# Patient Record
Sex: FEMALE | Race: BLACK OR AFRICAN AMERICAN | NOT HISPANIC OR LATINO | Employment: FULL TIME
[De-identification: names, ages, dates, MRNs, and addresses within clinical notes are randomized per-mention and may not be internally consistent; named-entity substitution may affect disease eponyms.]

---

## 2017-04-24 ENCOUNTER — RECORDS - HEALTHEAST (OUTPATIENT)
Dept: ADMINISTRATIVE | Facility: OTHER | Age: 22
End: 2017-04-24

## 2017-04-24 ENCOUNTER — TRANSFERRED RECORDS (OUTPATIENT)
Dept: HEALTH INFORMATION MANAGEMENT | Facility: CLINIC | Age: 22
End: 2017-04-24

## 2017-05-12 ENCOUNTER — PRE VISIT (OUTPATIENT)
Dept: NEUROLOGY | Facility: CLINIC | Age: 22
End: 2017-05-12

## 2017-05-12 NOTE — TELEPHONE ENCOUNTER
1.  Date/reason for appt: 5/22/17, Christian Yoder  2.  Referring provider: ANGELLA KONG MD  3.  Call to patient (Yes / No - short description): No, referred  4.  Previous care at / records requested from:   [facility]- faxed cover sheet.

## 2017-05-16 NOTE — TELEPHONE ENCOUNTER
Records received from [facility].   Included  Office notes: 3/3/17, 3/17/17, 3/29/17, 3/31/17, 4/24/17  Other: EMG on 5/17/16, 6/9/16    Missing/needed records: has seen a neurologist

## 2017-05-22 ENCOUNTER — OFFICE VISIT (OUTPATIENT)
Dept: NEUROLOGY | Facility: CLINIC | Age: 22
End: 2017-05-22

## 2017-05-22 VITALS — HEART RATE: 85 BPM | SYSTOLIC BLOOD PRESSURE: 132 MMHG | HEIGHT: 67 IN | DIASTOLIC BLOOD PRESSURE: 76 MMHG

## 2017-05-22 DIAGNOSIS — R20.9 SENSORY DISTURBANCE: Primary | ICD-10-CM

## 2017-05-22 RX ORDER — ONDANSETRON 4 MG/1
4 TABLET, ORALLY DISINTEGRATING ORAL
COMMUNITY
Start: 2016-06-03

## 2017-05-22 RX ORDER — TRAMADOL HYDROCHLORIDE 50 MG/1
TABLET ORAL
COMMUNITY
Start: 2017-05-05 | End: 2022-01-31

## 2017-05-22 RX ORDER — ZOLPIDEM TARTRATE 10 MG/1
TABLET ORAL
COMMUNITY
Start: 2017-05-05 | End: 2022-01-31

## 2017-05-22 RX ORDER — NYSTATIN 100000/ML
500000 SUSPENSION, ORAL (FINAL DOSE FORM) ORAL
COMMUNITY
Start: 2017-03-29 | End: 2022-01-31

## 2017-05-22 RX ORDER — LEVONORGESTREL/ETHIN.ESTRADIOL 0.1-0.02MG
TABLET ORAL
COMMUNITY
Start: 2016-05-18 | End: 2022-01-31

## 2017-05-22 RX ORDER — CETIRIZINE HYDROCHLORIDE 10 MG/1
TABLET ORAL
COMMUNITY

## 2017-05-22 RX ORDER — HYDROCODONE BITARTRATE AND ACETAMINOPHEN 5; 325 MG/1; MG/1
TABLET ORAL
COMMUNITY
Start: 2017-03-24 | End: 2022-01-31

## 2017-05-22 RX ORDER — GABAPENTIN 300 MG/1
CAPSULE ORAL
COMMUNITY
Start: 2017-03-17 | End: 2022-01-31

## 2017-05-22 RX ORDER — ALBUTEROL SULFATE 90 UG/1
AEROSOL, METERED RESPIRATORY (INHALATION)
COMMUNITY
Start: 2016-12-29 | End: 2022-02-28

## 2017-05-22 RX ORDER — MONTELUKAST SODIUM 10 MG/1
TABLET ORAL
COMMUNITY
Start: 2016-12-29 | End: 2022-01-31

## 2017-05-22 RX ORDER — FLUTICASONE PROPIONATE 50 MCG
1-2 SPRAY, SUSPENSION (ML) NASAL
COMMUNITY
Start: 2014-01-02

## 2017-05-22 ASSESSMENT — ENCOUNTER SYMPTOMS
FEVER: 0
LIGHT-HEADEDNESS: 0
DECREASED APPETITE: 1
PARALYSIS: 0
LOSS OF CONSCIOUSNESS: 0
HOARSE VOICE: 0
NECK MASS: 0
BACK PAIN: 1
INCREASED ENERGY: 0
LEG PAIN: 1
SPEECH CHANGE: 0
DECREASED CONCENTRATION: 0
STIFFNESS: 0
NIGHT SWEATS: 0
PANIC: 0
SEIZURES: 0
SINUS CONGESTION: 1
TACHYCARDIA: 0
SINUS PAIN: 1
DEPRESSION: 1
LEG SWELLING: 0
ALTERED TEMPERATURE REGULATION: 1
INSOMNIA: 1
JOINT SWELLING: 0
CLAUDICATION: 0
WEIGHT LOSS: 0
MEMORY LOSS: 0
ORTHOPNEA: 0
POLYDIPSIA: 0
EXERCISE INTOLERANCE: 1
CHILLS: 1
MYALGIAS: 1
NUMBNESS: 1
SYNCOPE: 0
POLYPHAGIA: 0
HALLUCINATIONS: 0
NERVOUS/ANXIOUS: 1
TINGLING: 1
ARTHRALGIAS: 1
PALPITATIONS: 0
TROUBLE SWALLOWING: 0
HEADACHES: 1
FATIGUE: 1
SORE THROAT: 0
MUSCLE CRAMPS: 0
TASTE DISTURBANCE: 0
DIZZINESS: 0
NECK PAIN: 1
WEIGHT GAIN: 1
HYPOTENSION: 0
MUSCLE WEAKNESS: 0
SMELL DISTURBANCE: 0
TREMORS: 0
DISTURBANCES IN COORDINATION: 0
WEAKNESS: 1
HYPERTENSION: 0

## 2017-05-22 ASSESSMENT — PAIN SCALES - GENERAL: PAINLEVEL: SEVERE PAIN (6)

## 2017-05-22 NOTE — LETTER
2017       RE: Meggan Osei  [address]  [place] [address]     Dear Colleague,    Thank you for referring your patient, Meggan Osei, to the [facility] NEUROLOGY at [facility]. Please see a copy of my visit note below.    May 22, 2017      Chava Joyce MD   [facility]    [address]   [place] [address]      RE: Meggan Osei   MRN: [id number]   : 1995      Dear Dr. Joyce:      I saw Meggan Osei for a second neurologic opinion on 2017.  She is a 21-year-old female with complaints of a migratory painful sensory disturbance.      This developed subsequent to sinus surgery that she had in 2016.  Within 2 weeks of the procedure, she noted a cold sensation across her shoulders.  Over time she has gone on to develop a similar sensation that can involve any part of her body.  Sometimes it is associated with a burning or stabbing component.  The description of the sensory symptoms do not seem to correspond to a peripheral nerve or dermatomal pattern when they occur.  The sensory symptoms tend to come and go, lasting up to an hour.  She cannot identify anything that precipitates these sensory symptoms.      She also reports a sense of fatigue and on one occasion a vibratory sense in her right hand.  She tells me in  or 2016, she had drooping of one side of her face, but she cannot recall which side and this only lasted a day.      She has undergone extensive evaluation for these symptoms.  She saw a Rheumatology, Infectious Disease, and Dr. Ana Gonzalez from Neurology.      She has had 2 MRI scans of the head.  The first was done in 2016 and the second in 2017.  Both studies showed a few nonspecific T2 signal changes in the frontal white matter which were felt to be nonspecific.      She had a cervical MRI scan which did not show any abnormality of the spinal cord or any neural  foraminal narrowing.      She had an upper extremity EMG examination done by Dr. Art in 05/2016 that does not show any evidence of peripheral neuropathy.  It did show increased insertional activity and nonsustained fibrillation potentials which were felt to be nonspecific and of uncertain clinical significance.  There was also complex repetitive discharge recorded from the right abductor pollicis brevis muscle.  A lower extremity EMG and nerve conduction study done in 06/2016 was normal.      She has had extensive laboratory testing done since the onset of her symptoms that have been largely unrevealing.  Specifically, she has had normal or negative Lyme serology, Anaplasma serology, parvovirus serology, TSH, B12, CK, hemoglobin A1c, basic metabolic panel, HEMANT, rheumatoid factor, CBC, sedimentation rate, C-reactive protein, liver functions, hepatitis C, and a sickle cell screen.  She was found to have a low vitamin D level this past March.  She was also found to have a positive EBV IgG titer.      She is taking gabapentin, both for her sensory symptoms and restless legs.      Her past history is notable for asthma and restless legs.      CURRENT MEDICATIONS:  Include:   1.  Zyrtec.     2.  Fluoxetine.   3.  Flonase.   4.  Gabapentin 300 mg in morning and 600 mg in the evening.   5.  Lutera.    6.  Singulair.     7.  Mycostatin.     8.  Omeprazole.     9.  Zofran p.r.n.   10. Tramadol p.r.n.   11.  Albuterol.     12.  Zolpidem.      ALLERGIES:  She has no medical allergies but beta blockers are felt to be contraindicated.      Family history is notable for a paternal second cousin with multiple sclerosis.  Otherwise, there is no family history of neurologic disease that she is aware of.      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  She does not smoke.  She occasionally uses alcohol.      Examination reveals a pleasant female.  She is alert and cooperative.  Heart rate 85.   Blood pressure 132/76.  Pupils are equal, round and react well to light.  Fundi are unremarkable.  Extraocular motility is full.  Facial strength and sensation are normal and otherwise cranial nerves II-XII are intact.  Motor examination reveals normal upper and lower extremity strength.  Sensory examination is intact to touch, pin, position, vibration and cold.  There is no sensory loss over the trunk.  Finger-to-nose and heel-to-shin are done well.  Her muscle tone is normal.  Her gait is normal.  She is able to tandem walk.  Reflexes are 2+.  Plantar responses are flexor.      IMPRESSION:   1.  Migratory sensory disturbance.   2.  Normal neurologic examination.      PLAN:  I did review the extensive testing that has been done to date with the patient.  Aside from some nonspecific brain MRI and EMG findings, the studies were normal.      I did reassure her that I found nothing that would indicate she has a serious underlying neurologic problem.  I do not think any further investigation is warranted at this time.      I told her I would be happy to see her back in the future as needed.      Sincerely,       Adán Bernabe MD           D: 2017 10:28   T: 2017 12:31   MT: AKA      Name:     KAYE SHAH   MRN:      [id number]        Account:      [id number]   :      1995           Service Date: 2017      Document: [id number]

## 2017-05-22 NOTE — PROGRESS NOTES
May 22, 2017      Chava Joyce MD   [facility]    [address]   [place] [address]      RE: Meggan Osei   MRN: [id number]   : 1995      Dear Dr. Joyce:      I saw Meggan Osei for a second neurologic opinion on 2017.  She is a 21-year-old female with complaints of a migratory painful sensory disturbance.      This developed subsequent to sinus surgery that she had in 2016.  Within 2 weeks of the procedure, she noted a cold sensation across her shoulders.  Over time she has gone on to develop a similar sensation that can involve any part of her body.  Sometimes it is associated with a burning or stabbing component.  The description of the sensory symptoms do not seem to correspond to a peripheral nerve or dermatomal pattern when they occur.  The sensory symptoms tend to come and go, lasting up to an hour.  She cannot identify anything that precipitates these sensory symptoms.      She also reports a sense of fatigue and on one occasion a vibratory sense in her right hand.  She tells me in  or 2016, she had drooping of one side of her face, but she cannot recall which side and this only lasted a day.      She has undergone extensive evaluation for these symptoms.  She saw a Rheumatology, Infectious Disease, and Dr. Ana Gonzalez from Neurology.      She has had 2 MRI scans of the head.  The first was done in 2016 and the second in 2017.  Both studies showed a few nonspecific T2 signal changes in the frontal white matter which were felt to be nonspecific.      She had a cervical MRI scan which did not show any abnormality of the spinal cord or any neural foraminal narrowing.      She had an upper extremity EMG examination done by Dr. Art in 2016 that does not show any evidence of peripheral neuropathy.  It did show increased insertional activity and nonsustained fibrillation potentials which were felt to be nonspecific  and of uncertain clinical significance.  There was also complex repetitive discharge recorded from the right abductor pollicis brevis muscle.  A lower extremity EMG and nerve conduction study done in 06/2016 was normal.      She has had extensive laboratory testing done since the onset of her symptoms that have been largely unrevealing.  Specifically, she has had normal or negative Lyme serology, Anaplasma serology, parvovirus serology, TSH, B12, CK, hemoglobin A1c, basic metabolic panel, HEMANT, rheumatoid factor, CBC, sedimentation rate, C-reactive protein, liver functions, hepatitis C, and a sickle cell screen.  She was found to have a low vitamin D level this past March.  She was also found to have a positive EBV IgG titer.      She is taking gabapentin, both for her sensory symptoms and restless legs.      Her past history is notable for asthma and restless legs.      CURRENT MEDICATIONS:  Include:   1.  Zyrtec.     2.  Fluoxetine.   3.  Flonase.   4.  Gabapentin 300 mg in morning and 600 mg in the evening.   5.  Lutera.    6.  Singulair.     7.  Mycostatin.     8.  Omeprazole.     9.  Zofran p.r.n.   10. Tramadol p.r.n.   11.  Albuterol.     12.  Zolpidem.      ALLERGIES:  She has no medical allergies but beta blockers are felt to be contraindicated.      Family history is notable for a paternal second cousin with multiple sclerosis.  Otherwise, there is no family history of neurologic disease that she is aware of.      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  She does not smoke.  She occasionally uses alcohol.      Examination reveals a pleasant female.  She is alert and cooperative.  Heart rate 85.  Blood pressure 132/76.  Pupils are equal, round and react well to light.  Fundi are unremarkable.  Extraocular motility is full.  Facial strength and sensation are normal and otherwise cranial nerves II-XII are intact.  Motor examination reveals normal upper and lower extremity  strength.  Sensory examination is intact to touch, pin, position, vibration and cold.  There is no sensory loss over the trunk.  Finger-to-nose and heel-to-shin are done well.  Her muscle tone is normal.  Her gait is normal.  She is able to tandem walk.  Reflexes are 2+.  Plantar responses are flexor.      IMPRESSION:   1.  Migratory sensory disturbance.   2.  Normal neurologic examination.      PLAN:  I did review the extensive testing that has been done to date with the patient.  Aside from some nonspecific brain MRI and EMG findings, the studies were normal.      I did reassure her that I found nothing that would indicate she has a serious underlying neurologic problem.  I do not think any further investigation is warranted at this time.      I told her I would be happy to see her back in the future as needed.      Sincerely,       MD RAJENDRA Calderón MD             D: 2017 10:28   T: 2017 12:31   MT: AKA      Name:     KAYE SHAH   MRN:      [id number]        Account:      [id number]   :      1995           Service Date: 2017      Document: [id number]

## 2017-05-22 NOTE — MR AVS SNAPSHOT
"              After Visit Summary   5/22/2017    Meggan Osei    MRN: [id number]           Patient Information     Date Of Birth          1995        Visit Information        Provider Department      5/22/2017 8:50 AM Adán Bernabe MD [facility] Neurology        Today's Diagnoses     Sensory disturbance    -  1       Follow-ups after your visit        Follow-up notes from your care team     Discussed this visit Return if symptoms worsen or fail to improve.      Who to contact     Please call your clinic at [phone] to:    Ask questions about your health    Make or cancel appointments    Discuss your medicines    Learn about your test results    Speak to your doctor   If you have compliments or concerns about an experience at your clinic, or if you wish to file a complaint, please contact [facility] Patient Relations at [phone] or email us at [email or web address]         Additional Information About Your Visit        MyChart Information     [employer] gives you secure access to your electronic health record. If you see a primary care provider, you can also send messages to your care team and make appointments. If you have questions, please call your primary care clinic.  If you do not have a primary care provider, please call [phone] and they will assist you.      [employer] is an electronic gateway that provides easy, online access to your medical records. With [employer], you can request a clinic appointment, read your test results, renew a prescription or communicate with your care team.     To access your existing account, please contact your [facility] or call [phone] for assistance.        Care EveryWhere ID     This is your Care EveryWhere ID. This could be used by other organizations to access your [place] medical records  [id number]        Your Vitals Were     Pulse Height                85 1.702 m (5' 7\")           " Blood Pressure from Last 3 Encounters:   05/22/17 132/76    Weight from Last 3 Encounters:   No data found for Wt              Today, you had the following     No orders found for display       Primary Care Provider Office Phone # Fax #    Chava Joyce [phone]       [facility] [address]  [facility] [facility] [place] [address]        Thank you!     Thank you for choosing [facility] NEUROLOGY  for your care. Our goal is always to provide you with excellent care. Hearing back from our patients is one way we can continue to improve our services. Please take a few minutes to complete the written survey that you may receive in the mail after your visit with us. Thank you!             Your Updated Medication List - Protect others around you: Learn how to safely use, store and throw away your medicines at www.disposemymeds.org.          This list is accurate as of: 5/22/17  9:35 AM.  Always use your most recent med list.                   Brand Name Dispense Instructions for use    cetirizine 10 MG tablet    zyrTEC     Reported on 3/24/2017       FLUoxetine 20 MG capsule    PROzac     Take 20 mg by mouth       fluticasone 50 MCG/ACT spray    FLONASE     1-2 sprays       gabapentin 300 MG capsule    NEURONTIN     1 tab in am and 2 tab in evening       HYDROcodone-acetaminophen 5-325 MG per tablet    NORCO         LUTERA 0.1-20 MG-MCG per tablet   Generic drug:  levonorgestrel-ethinyl estradiol          montelukast 10 MG tablet    SINGULAIR     TAKE 1 TABLET BY MOUTH EVERY EVENING       nystatin 021464 UNIT/ML suspension    MYCOSTATIN     500,000 Units       omeprazole 20 MG CR capsule    priLOSEC     Take 20 mg by mouth       ondansetron 4 MG ODT tab    ZOFRAN-ODT     Take 4 mg by mouth       Sodium Chloride 0.65 % Soln      2 sprays       traMADol 50 MG tablet    ULTRAM     TAKE 1 TABLET BY MOUTH EVERY 6 HOURS AS NEEDED FOR PAIN       VENTOLIN  (90 BASE) MCG/ACT Inhaler   Generic drug:   albuterol      INHALE 2 PUFFS BY MOUTH EVERY 4 HOURS AS NEEDED FOR WHEEZING.       zolpidem 10 MG tablet    AMBIEN     TAKE 1 TABLET BY MOUTH EVERY DAY AT BEDTIME AS NEEDED FOR SLEEP FOR UP TO 30 DAYS

## 2017-06-07 ENCOUNTER — RECORDS - HEALTHEAST (OUTPATIENT)
Dept: ADMINISTRATIVE | Facility: OTHER | Age: 22
End: 2017-06-07

## 2017-08-11 ENCOUNTER — RECORDS - HEALTHEAST (OUTPATIENT)
Dept: ADMINISTRATIVE | Facility: OTHER | Age: 22
End: 2017-08-11

## 2017-08-12 ENCOUNTER — HEALTH MAINTENANCE LETTER (OUTPATIENT)
Age: 22
End: 2017-08-12

## 2017-09-21 ENCOUNTER — RECORDS - HEALTHEAST (OUTPATIENT)
Dept: ADMINISTRATIVE | Facility: OTHER | Age: 22
End: 2017-09-21

## 2017-11-24 ENCOUNTER — RECORDS - HEALTHEAST (OUTPATIENT)
Dept: ADMINISTRATIVE | Facility: OTHER | Age: 22
End: 2017-11-24

## 2017-12-20 ENCOUNTER — RECORDS - HEALTHEAST (OUTPATIENT)
Dept: ADMINISTRATIVE | Facility: OTHER | Age: 22
End: 2017-12-20

## 2018-06-06 ENCOUNTER — AMBULATORY - HEALTHEAST (OUTPATIENT)
Dept: ADMINISTRATIVE | Facility: OTHER | Age: 23
End: 2018-06-06

## 2018-06-07 ENCOUNTER — OFFICE VISIT - HEALTHEAST (OUTPATIENT)
Dept: FAMILY MEDICINE | Facility: CLINIC | Age: 23
End: 2018-06-07

## 2018-06-07 ENCOUNTER — COMMUNICATION - HEALTHEAST (OUTPATIENT)
Dept: TELEHEALTH | Facility: CLINIC | Age: 23
End: 2018-06-07

## 2018-06-07 DIAGNOSIS — J30.1 ALLERGIC RHINITIS DUE TO POLLEN: ICD-10-CM

## 2018-06-07 DIAGNOSIS — J33.0: ICD-10-CM

## 2018-07-02 ENCOUNTER — COMMUNICATION - HEALTHEAST (OUTPATIENT)
Dept: ADMINISTRATIVE | Facility: CLINIC | Age: 23
End: 2018-07-02

## 2018-07-10 ENCOUNTER — COMMUNICATION - HEALTHEAST (OUTPATIENT)
Dept: FAMILY MEDICINE | Facility: CLINIC | Age: 23
End: 2018-07-10

## 2018-07-11 ENCOUNTER — COMMUNICATION - HEALTHEAST (OUTPATIENT)
Dept: TELEHEALTH | Facility: CLINIC | Age: 23
End: 2018-07-11

## 2018-07-11 ENCOUNTER — OFFICE VISIT - HEALTHEAST (OUTPATIENT)
Dept: FAMILY MEDICINE | Facility: CLINIC | Age: 23
End: 2018-07-11

## 2018-07-11 DIAGNOSIS — M79.10 MYALGIA: ICD-10-CM

## 2018-09-26 ENCOUNTER — COMMUNICATION - HEALTHEAST (OUTPATIENT)
Dept: FAMILY MEDICINE | Facility: CLINIC | Age: 23
End: 2018-09-26

## 2018-10-03 ENCOUNTER — OFFICE VISIT - HEALTHEAST (OUTPATIENT)
Dept: FAMILY MEDICINE | Facility: CLINIC | Age: 23
End: 2018-10-03

## 2018-10-03 ENCOUNTER — COMMUNICATION - HEALTHEAST (OUTPATIENT)
Dept: TELEHEALTH | Facility: CLINIC | Age: 23
End: 2018-10-03

## 2018-10-03 DIAGNOSIS — M79.10 MYALGIA: ICD-10-CM

## 2018-10-03 DIAGNOSIS — J45.909 REACTIVE AIRWAY DISEASE: ICD-10-CM

## 2018-10-03 DIAGNOSIS — R11.0 NAUSEA: ICD-10-CM

## 2018-10-03 LAB
ERYTHROCYTE [DISTWIDTH] IN BLOOD BY AUTOMATED COUNT: 12.2 % (ref 11–14.5)
HCT VFR BLD AUTO: 38.9 % (ref 35–47)
HGB BLD-MCNC: 13.2 G/DL (ref 12–16)
MCH RBC QN AUTO: 31.1 PG (ref 27–34)
MCHC RBC AUTO-ENTMCNC: 33.8 G/DL (ref 32–36)
MCV RBC AUTO: 92 FL (ref 80–100)
PLATELET # BLD AUTO: 255 THOU/UL (ref 140–440)
PMV BLD AUTO: 7.8 FL (ref 7–10)
RBC # BLD AUTO: 4.24 MILL/UL (ref 3.8–5.4)
WBC: 7.6 THOU/UL (ref 4–11)

## 2018-10-04 LAB
ANION GAP SERPL CALCULATED.3IONS-SCNC: 8 MMOL/L (ref 5–18)
BUN SERPL-MCNC: 9 MG/DL (ref 8–22)
CALCIUM SERPL-MCNC: 9.3 MG/DL (ref 8.5–10.5)
CHLORIDE BLD-SCNC: 106 MMOL/L (ref 98–107)
CO2 SERPL-SCNC: 26 MMOL/L (ref 22–31)
CREAT SERPL-MCNC: 0.9 MG/DL (ref 0.6–1.1)
GFR SERPL CREATININE-BSD FRML MDRD: >60 ML/MIN/1.73M2
GLUCOSE BLD-MCNC: 75 MG/DL (ref 70–125)
POTASSIUM BLD-SCNC: 4.1 MMOL/L (ref 3.5–5)
SODIUM SERPL-SCNC: 140 MMOL/L (ref 136–145)

## 2018-12-04 ENCOUNTER — COMMUNICATION - HEALTHEAST (OUTPATIENT)
Dept: FAMILY MEDICINE | Facility: CLINIC | Age: 23
End: 2018-12-04

## 2018-12-05 ENCOUNTER — OFFICE VISIT - HEALTHEAST (OUTPATIENT)
Dept: FAMILY MEDICINE | Facility: CLINIC | Age: 23
End: 2018-12-05

## 2018-12-05 ENCOUNTER — COMMUNICATION - HEALTHEAST (OUTPATIENT)
Dept: FAMILY MEDICINE | Facility: CLINIC | Age: 23
End: 2018-12-05

## 2018-12-05 DIAGNOSIS — M54.50 ACUTE BILATERAL LOW BACK PAIN WITHOUT SCIATICA: ICD-10-CM

## 2019-02-28 ENCOUNTER — OFFICE VISIT - HEALTHEAST (OUTPATIENT)
Dept: FAMILY MEDICINE | Facility: CLINIC | Age: 24
End: 2019-02-28

## 2019-02-28 DIAGNOSIS — M79.2 NERVE PAIN: ICD-10-CM

## 2019-03-08 ENCOUNTER — HOSPITAL ENCOUNTER (OUTPATIENT)
Dept: PHYSICAL MEDICINE AND REHAB | Facility: CLINIC | Age: 24
Discharge: HOME | End: 2019-03-08
Attending: FAMILY MEDICINE

## 2019-03-08 DIAGNOSIS — M54.2 CERVICALGIA: ICD-10-CM

## 2019-03-08 DIAGNOSIS — R20.2 PARESTHESIA OF LEFT UPPER EXTREMITY: ICD-10-CM

## 2019-03-08 DIAGNOSIS — R20.2 PARESTHESIA OF RIGHT UPPER EXTREMITY: ICD-10-CM

## 2019-03-08 DIAGNOSIS — M54.42 CHRONIC BILATERAL LOW BACK PAIN WITH BILATERAL SCIATICA: ICD-10-CM

## 2019-03-08 DIAGNOSIS — G89.29 CHRONIC BILATERAL THORACIC BACK PAIN: ICD-10-CM

## 2019-03-08 DIAGNOSIS — M54.41 CHRONIC BILATERAL LOW BACK PAIN WITH BILATERAL SCIATICA: ICD-10-CM

## 2019-03-08 DIAGNOSIS — M54.6 CHRONIC BILATERAL THORACIC BACK PAIN: ICD-10-CM

## 2019-03-08 DIAGNOSIS — G89.29 CHRONIC BILATERAL LOW BACK PAIN WITH BILATERAL SCIATICA: ICD-10-CM

## 2019-03-08 DIAGNOSIS — M79.18 MYOFASCIAL PAIN: ICD-10-CM

## 2019-03-08 ASSESSMENT — MIFFLIN-ST. JEOR: SCORE: 1609.29

## 2019-03-12 ENCOUNTER — AMBULATORY - HEALTHEAST (OUTPATIENT)
Dept: LAB | Facility: CLINIC | Age: 24
End: 2019-03-12

## 2019-03-12 DIAGNOSIS — M54.2 CERVICALGIA: ICD-10-CM

## 2019-03-12 DIAGNOSIS — M79.18 MYOFASCIAL PAIN: ICD-10-CM

## 2019-03-12 DIAGNOSIS — G89.29 CHRONIC BILATERAL THORACIC BACK PAIN: ICD-10-CM

## 2019-03-12 DIAGNOSIS — M54.41 CHRONIC BILATERAL LOW BACK PAIN WITH BILATERAL SCIATICA: ICD-10-CM

## 2019-03-12 DIAGNOSIS — G89.29 CHRONIC BILATERAL LOW BACK PAIN WITH BILATERAL SCIATICA: ICD-10-CM

## 2019-03-12 DIAGNOSIS — M54.42 CHRONIC BILATERAL LOW BACK PAIN WITH BILATERAL SCIATICA: ICD-10-CM

## 2019-03-12 DIAGNOSIS — M54.6 CHRONIC BILATERAL THORACIC BACK PAIN: ICD-10-CM

## 2019-03-12 DIAGNOSIS — R20.2 PARESTHESIA OF LEFT UPPER EXTREMITY: ICD-10-CM

## 2019-03-12 DIAGNOSIS — R20.2 PARESTHESIA OF RIGHT UPPER EXTREMITY: ICD-10-CM

## 2019-03-12 LAB
C REACTIVE PROTEIN LHE: <0.1 MG/DL (ref 0–0.8)
CCP AB SER IA-ACNC: <0.5 U/ML
ERYTHROCYTE [SEDIMENTATION RATE] IN BLOOD BY WESTERGREN METHOD: 9 MM/HR (ref 0–20)
VIT B12 SERPL-MCNC: 363 PG/ML (ref 213–816)

## 2019-03-13 LAB — 25(OH)D3 SERPL-MCNC: 14.9 NG/ML (ref 30–80)

## 2019-03-19 ENCOUNTER — COMMUNICATION - HEALTHEAST (OUTPATIENT)
Dept: PHYSICAL MEDICINE AND REHAB | Facility: CLINIC | Age: 24
End: 2019-03-19

## 2019-04-01 ENCOUNTER — COMMUNICATION - HEALTHEAST (OUTPATIENT)
Dept: PHYSICAL MEDICINE AND REHAB | Facility: CLINIC | Age: 24
End: 2019-04-01

## 2019-04-01 DIAGNOSIS — M54.6 CHRONIC BILATERAL THORACIC BACK PAIN: ICD-10-CM

## 2019-04-01 DIAGNOSIS — R20.2 PARESTHESIA OF LEFT UPPER EXTREMITY: ICD-10-CM

## 2019-04-01 DIAGNOSIS — M54.42 CHRONIC BILATERAL LOW BACK PAIN WITH BILATERAL SCIATICA: ICD-10-CM

## 2019-04-01 DIAGNOSIS — M79.18 MYOFASCIAL PAIN: ICD-10-CM

## 2019-04-01 DIAGNOSIS — M54.2 CERVICALGIA: ICD-10-CM

## 2019-04-01 DIAGNOSIS — G89.29 CHRONIC BILATERAL THORACIC BACK PAIN: ICD-10-CM

## 2019-04-01 DIAGNOSIS — M54.41 CHRONIC BILATERAL LOW BACK PAIN WITH BILATERAL SCIATICA: ICD-10-CM

## 2019-04-01 DIAGNOSIS — G89.29 CHRONIC BILATERAL LOW BACK PAIN WITH BILATERAL SCIATICA: ICD-10-CM

## 2019-04-01 DIAGNOSIS — R20.2 PARESTHESIA OF RIGHT UPPER EXTREMITY: ICD-10-CM

## 2019-04-02 ENCOUNTER — COMMUNICATION - HEALTHEAST (OUTPATIENT)
Dept: PHYSICAL MEDICINE AND REHAB | Facility: CLINIC | Age: 24
End: 2019-04-02

## 2019-04-10 ENCOUNTER — COMMUNICATION - HEALTHEAST (OUTPATIENT)
Dept: PHYSICAL MEDICINE AND REHAB | Facility: CLINIC | Age: 24
End: 2019-04-10

## 2019-05-02 ENCOUNTER — COMMUNICATION - HEALTHEAST (OUTPATIENT)
Dept: FAMILY MEDICINE | Facility: CLINIC | Age: 24
End: 2019-05-02

## 2019-05-02 DIAGNOSIS — R11.0 NAUSEA: ICD-10-CM

## 2019-05-14 ENCOUNTER — OFFICE VISIT - HEALTHEAST (OUTPATIENT)
Dept: FAMILY MEDICINE | Facility: CLINIC | Age: 24
End: 2019-05-14

## 2019-05-14 DIAGNOSIS — R11.0 NAUSEA: ICD-10-CM

## 2019-08-13 ENCOUNTER — OFFICE VISIT - HEALTHEAST (OUTPATIENT)
Dept: FAMILY MEDICINE | Facility: CLINIC | Age: 24
End: 2019-08-13

## 2019-08-13 DIAGNOSIS — M54.5 LOW BACK PAIN, UNSPECIFIED BACK PAIN LATERALITY, UNSPECIFIED CHRONICITY, WITH SCIATICA PRESENCE UNSPECIFIED: ICD-10-CM

## 2019-08-21 ENCOUNTER — HOSPITAL ENCOUNTER (OUTPATIENT)
Dept: MRI IMAGING | Facility: CLINIC | Age: 24
Discharge: HOME | End: 2019-08-21
Attending: FAMILY MEDICINE

## 2019-08-21 DIAGNOSIS — M54.5 LOW BACK PAIN, UNSPECIFIED BACK PAIN LATERALITY, UNSPECIFIED CHRONICITY, WITH SCIATICA PRESENCE UNSPECIFIED: ICD-10-CM

## 2019-08-22 ENCOUNTER — AMBULATORY - HEALTHEAST (OUTPATIENT)
Dept: FAMILY MEDICINE | Facility: CLINIC | Age: 24
End: 2019-08-22

## 2019-08-22 ENCOUNTER — COMMUNICATION - HEALTHEAST (OUTPATIENT)
Dept: FAMILY MEDICINE | Facility: CLINIC | Age: 24
End: 2019-08-22

## 2019-08-22 DIAGNOSIS — M51.9 ANNULAR DISC TEAR: ICD-10-CM

## 2019-09-09 ENCOUNTER — COMMUNICATION - HEALTHEAST (OUTPATIENT)
Dept: FAMILY MEDICINE | Facility: CLINIC | Age: 24
End: 2019-09-09

## 2019-09-11 ENCOUNTER — HOSPITAL ENCOUNTER (OUTPATIENT)
Dept: PHYSICAL MEDICINE AND REHAB | Facility: CLINIC | Age: 24
Discharge: HOME | End: 2019-09-11
Attending: PAIN MEDICINE

## 2019-09-11 DIAGNOSIS — R20.2 PARESTHESIA OF LEFT UPPER EXTREMITY: ICD-10-CM

## 2019-09-11 DIAGNOSIS — M79.18 MYOFASCIAL PAIN: ICD-10-CM

## 2019-09-11 DIAGNOSIS — M54.42 CHRONIC BILATERAL LOW BACK PAIN WITH BILATERAL SCIATICA: ICD-10-CM

## 2019-09-11 DIAGNOSIS — G89.29 CHRONIC BILATERAL THORACIC BACK PAIN: ICD-10-CM

## 2019-09-11 DIAGNOSIS — G89.29 CHRONIC BILATERAL LOW BACK PAIN WITH BILATERAL SCIATICA: ICD-10-CM

## 2019-09-11 DIAGNOSIS — R20.2 PARESTHESIA OF RIGHT UPPER EXTREMITY: ICD-10-CM

## 2019-09-11 DIAGNOSIS — M54.6 CHRONIC BILATERAL THORACIC BACK PAIN: ICD-10-CM

## 2019-09-11 DIAGNOSIS — M54.41 CHRONIC BILATERAL LOW BACK PAIN WITH BILATERAL SCIATICA: ICD-10-CM

## 2019-09-25 ENCOUNTER — OFFICE VISIT - HEALTHEAST (OUTPATIENT)
Dept: PHYSICAL THERAPY | Facility: CLINIC | Age: 24
End: 2019-09-25

## 2019-09-25 DIAGNOSIS — M54.6 CHRONIC BILATERAL THORACIC BACK PAIN: ICD-10-CM

## 2019-09-25 DIAGNOSIS — R20.2 PARESTHESIA OF LEFT UPPER EXTREMITY: ICD-10-CM

## 2019-09-25 DIAGNOSIS — M54.42 CHRONIC BILATERAL LOW BACK PAIN WITH BILATERAL SCIATICA: ICD-10-CM

## 2019-09-25 DIAGNOSIS — M79.18 MYOFASCIAL PAIN: ICD-10-CM

## 2019-09-25 DIAGNOSIS — G89.29 CHRONIC BILATERAL THORACIC BACK PAIN: ICD-10-CM

## 2019-09-25 DIAGNOSIS — R20.2 PARESTHESIA OF RIGHT UPPER EXTREMITY: ICD-10-CM

## 2019-09-25 DIAGNOSIS — M54.41 CHRONIC BILATERAL LOW BACK PAIN WITH BILATERAL SCIATICA: ICD-10-CM

## 2019-09-25 DIAGNOSIS — G89.29 CHRONIC BILATERAL LOW BACK PAIN WITH BILATERAL SCIATICA: ICD-10-CM

## 2019-10-09 ENCOUNTER — OFFICE VISIT - HEALTHEAST (OUTPATIENT)
Dept: PHYSICAL THERAPY | Facility: CLINIC | Age: 24
End: 2019-10-09

## 2019-10-09 DIAGNOSIS — M54.42 CHRONIC BILATERAL LOW BACK PAIN WITH BILATERAL SCIATICA: ICD-10-CM

## 2019-10-09 DIAGNOSIS — G89.29 CHRONIC BILATERAL THORACIC BACK PAIN: ICD-10-CM

## 2019-10-09 DIAGNOSIS — M54.6 CHRONIC BILATERAL THORACIC BACK PAIN: ICD-10-CM

## 2019-10-09 DIAGNOSIS — R20.2 PARESTHESIA OF LEFT UPPER EXTREMITY: ICD-10-CM

## 2019-10-09 DIAGNOSIS — G89.29 CHRONIC BILATERAL LOW BACK PAIN WITH BILATERAL SCIATICA: ICD-10-CM

## 2019-10-09 DIAGNOSIS — R20.2 PARESTHESIA OF RIGHT UPPER EXTREMITY: ICD-10-CM

## 2019-10-09 DIAGNOSIS — M54.41 CHRONIC BILATERAL LOW BACK PAIN WITH BILATERAL SCIATICA: ICD-10-CM

## 2019-10-09 DIAGNOSIS — M79.18 MYOFASCIAL PAIN: ICD-10-CM

## 2019-10-16 ENCOUNTER — OFFICE VISIT - HEALTHEAST (OUTPATIENT)
Dept: PHYSICAL THERAPY | Facility: CLINIC | Age: 24
End: 2019-10-16

## 2019-10-16 DIAGNOSIS — G89.29 CHRONIC BILATERAL LOW BACK PAIN WITH BILATERAL SCIATICA: ICD-10-CM

## 2019-10-16 DIAGNOSIS — R20.2 PARESTHESIA OF RIGHT UPPER EXTREMITY: ICD-10-CM

## 2019-10-16 DIAGNOSIS — R20.2 PARESTHESIA OF LEFT UPPER EXTREMITY: ICD-10-CM

## 2019-10-16 DIAGNOSIS — M54.41 CHRONIC BILATERAL LOW BACK PAIN WITH BILATERAL SCIATICA: ICD-10-CM

## 2019-10-16 DIAGNOSIS — M54.6 CHRONIC BILATERAL THORACIC BACK PAIN: ICD-10-CM

## 2019-10-16 DIAGNOSIS — M54.42 CHRONIC BILATERAL LOW BACK PAIN WITH BILATERAL SCIATICA: ICD-10-CM

## 2019-10-16 DIAGNOSIS — M79.18 MYOFASCIAL PAIN: ICD-10-CM

## 2019-10-16 DIAGNOSIS — G89.29 CHRONIC BILATERAL THORACIC BACK PAIN: ICD-10-CM

## 2019-10-18 ENCOUNTER — OFFICE VISIT - HEALTHEAST (OUTPATIENT)
Dept: PHYSICAL THERAPY | Facility: CLINIC | Age: 24
End: 2019-10-18

## 2019-10-18 DIAGNOSIS — R20.2 PARESTHESIA OF RIGHT UPPER EXTREMITY: ICD-10-CM

## 2019-10-18 DIAGNOSIS — G89.29 CHRONIC BILATERAL LOW BACK PAIN WITH BILATERAL SCIATICA: ICD-10-CM

## 2019-10-18 DIAGNOSIS — M54.6 CHRONIC BILATERAL THORACIC BACK PAIN: ICD-10-CM

## 2019-10-18 DIAGNOSIS — M79.18 MYOFASCIAL PAIN: ICD-10-CM

## 2019-10-18 DIAGNOSIS — G89.29 CHRONIC BILATERAL THORACIC BACK PAIN: ICD-10-CM

## 2019-10-18 DIAGNOSIS — R20.2 PARESTHESIA OF LEFT UPPER EXTREMITY: ICD-10-CM

## 2019-10-18 DIAGNOSIS — M54.41 CHRONIC BILATERAL LOW BACK PAIN WITH BILATERAL SCIATICA: ICD-10-CM

## 2019-10-18 DIAGNOSIS — M54.42 CHRONIC BILATERAL LOW BACK PAIN WITH BILATERAL SCIATICA: ICD-10-CM

## 2019-10-21 ENCOUNTER — OFFICE VISIT - HEALTHEAST (OUTPATIENT)
Dept: PHYSICAL THERAPY | Facility: CLINIC | Age: 24
End: 2019-10-21

## 2019-10-21 DIAGNOSIS — M54.42 CHRONIC BILATERAL LOW BACK PAIN WITH BILATERAL SCIATICA: ICD-10-CM

## 2019-10-21 DIAGNOSIS — R20.2 PARESTHESIA OF RIGHT UPPER EXTREMITY: ICD-10-CM

## 2019-10-21 DIAGNOSIS — M54.6 CHRONIC BILATERAL THORACIC BACK PAIN: ICD-10-CM

## 2019-10-21 DIAGNOSIS — R20.2 PARESTHESIA OF LEFT UPPER EXTREMITY: ICD-10-CM

## 2019-10-21 DIAGNOSIS — G89.29 CHRONIC BILATERAL LOW BACK PAIN WITH BILATERAL SCIATICA: ICD-10-CM

## 2019-10-21 DIAGNOSIS — M79.18 MYOFASCIAL PAIN: ICD-10-CM

## 2019-10-21 DIAGNOSIS — G89.29 CHRONIC BILATERAL THORACIC BACK PAIN: ICD-10-CM

## 2019-10-21 DIAGNOSIS — M54.41 CHRONIC BILATERAL LOW BACK PAIN WITH BILATERAL SCIATICA: ICD-10-CM

## 2019-10-23 ENCOUNTER — COMMUNICATION - HEALTHEAST (OUTPATIENT)
Dept: PHYSICAL THERAPY | Facility: CLINIC | Age: 24
End: 2019-10-23

## 2019-10-28 ENCOUNTER — OFFICE VISIT - HEALTHEAST (OUTPATIENT)
Dept: PHYSICAL THERAPY | Facility: CLINIC | Age: 24
End: 2019-10-28

## 2019-10-28 DIAGNOSIS — M54.41 CHRONIC BILATERAL LOW BACK PAIN WITH BILATERAL SCIATICA: ICD-10-CM

## 2019-10-28 DIAGNOSIS — M54.42 CHRONIC BILATERAL LOW BACK PAIN WITH BILATERAL SCIATICA: ICD-10-CM

## 2019-10-28 DIAGNOSIS — R20.2 PARESTHESIA OF LEFT UPPER EXTREMITY: ICD-10-CM

## 2019-10-28 DIAGNOSIS — M54.6 CHRONIC BILATERAL THORACIC BACK PAIN: ICD-10-CM

## 2019-10-28 DIAGNOSIS — R20.2 PARESTHESIA OF RIGHT UPPER EXTREMITY: ICD-10-CM

## 2019-10-28 DIAGNOSIS — M79.18 MYOFASCIAL PAIN: ICD-10-CM

## 2019-10-28 DIAGNOSIS — G89.29 CHRONIC BILATERAL LOW BACK PAIN WITH BILATERAL SCIATICA: ICD-10-CM

## 2019-10-28 DIAGNOSIS — G89.29 CHRONIC BILATERAL THORACIC BACK PAIN: ICD-10-CM

## 2019-10-30 ENCOUNTER — OFFICE VISIT - HEALTHEAST (OUTPATIENT)
Dept: PHYSICAL THERAPY | Facility: CLINIC | Age: 24
End: 2019-10-30

## 2019-10-30 DIAGNOSIS — R20.2 PARESTHESIA OF RIGHT UPPER EXTREMITY: ICD-10-CM

## 2019-10-30 DIAGNOSIS — G89.29 CHRONIC BILATERAL THORACIC BACK PAIN: ICD-10-CM

## 2019-10-30 DIAGNOSIS — M54.42 CHRONIC BILATERAL LOW BACK PAIN WITH BILATERAL SCIATICA: ICD-10-CM

## 2019-10-30 DIAGNOSIS — M54.6 CHRONIC BILATERAL THORACIC BACK PAIN: ICD-10-CM

## 2019-10-30 DIAGNOSIS — G89.29 CHRONIC BILATERAL LOW BACK PAIN WITH BILATERAL SCIATICA: ICD-10-CM

## 2019-10-30 DIAGNOSIS — M79.18 MYOFASCIAL PAIN: ICD-10-CM

## 2019-10-30 DIAGNOSIS — M54.41 CHRONIC BILATERAL LOW BACK PAIN WITH BILATERAL SCIATICA: ICD-10-CM

## 2019-10-30 DIAGNOSIS — R20.2 PARESTHESIA OF LEFT UPPER EXTREMITY: ICD-10-CM

## 2019-11-05 ENCOUNTER — HEALTH MAINTENANCE LETTER (OUTPATIENT)
Age: 24
End: 2019-11-05

## 2020-01-02 ENCOUNTER — COMMUNICATION - HEALTHEAST (OUTPATIENT)
Dept: FAMILY MEDICINE | Facility: CLINIC | Age: 25
End: 2020-01-02

## 2020-01-02 DIAGNOSIS — Z00.00 WELLNESS EXAMINATION: ICD-10-CM

## 2020-01-09 ENCOUNTER — OFFICE VISIT - HEALTHEAST (OUTPATIENT)
Dept: FAMILY MEDICINE | Facility: CLINIC | Age: 25
End: 2020-01-09

## 2020-01-09 DIAGNOSIS — J06.9 VIRAL URI: ICD-10-CM

## 2020-01-09 DIAGNOSIS — J45.21 MILD INTERMITTENT ASTHMA WITH EXACERBATION: ICD-10-CM

## 2020-01-09 DIAGNOSIS — R07.0 THROAT PAIN: ICD-10-CM

## 2020-01-09 LAB — DEPRECATED S PYO AG THROAT QL EIA: NORMAL

## 2020-01-10 LAB — GROUP A STREP BY PCR: NORMAL

## 2020-11-22 ENCOUNTER — HEALTH MAINTENANCE LETTER (OUTPATIENT)
Age: 25
End: 2020-11-22

## 2020-12-08 ENCOUNTER — COMMUNICATION - HEALTHEAST (OUTPATIENT)
Dept: FAMILY MEDICINE | Facility: CLINIC | Age: 25
End: 2020-12-08

## 2020-12-08 DIAGNOSIS — Z00.00 WELLNESS EXAMINATION: ICD-10-CM

## 2021-01-06 ENCOUNTER — OFFICE VISIT - HEALTHEAST (OUTPATIENT)
Dept: FAMILY MEDICINE | Facility: CLINIC | Age: 26
End: 2021-01-06

## 2021-01-06 DIAGNOSIS — Z00.00 WELLNESS EXAMINATION: ICD-10-CM

## 2021-01-06 DIAGNOSIS — J01.90 ACUTE NON-RECURRENT SINUSITIS, UNSPECIFIED LOCATION: ICD-10-CM

## 2021-01-06 DIAGNOSIS — J45.909 REACTIVE AIRWAY DISEASE: ICD-10-CM

## 2021-01-06 LAB
ANION GAP SERPL CALCULATED.3IONS-SCNC: 6 MMOL/L (ref 5–18)
BUN SERPL-MCNC: 7 MG/DL (ref 8–22)
CALCIUM SERPL-MCNC: 8.8 MG/DL (ref 8.5–10.5)
CHLORIDE BLD-SCNC: 107 MMOL/L (ref 98–107)
CHOLEST SERPL-MCNC: 197 MG/DL
CO2 SERPL-SCNC: 27 MMOL/L (ref 22–31)
CREAT SERPL-MCNC: 0.96 MG/DL (ref 0.6–1.1)
ERYTHROCYTE [DISTWIDTH] IN BLOOD BY AUTOMATED COUNT: 11.9 % (ref 11–14.5)
FASTING STATUS PATIENT QL REPORTED: NO
GFR SERPL CREATININE-BSD FRML MDRD: >60 ML/MIN/1.73M2
GLUCOSE BLD-MCNC: 80 MG/DL (ref 70–125)
HCT VFR BLD AUTO: 41.1 % (ref 35–47)
HDLC SERPL-MCNC: 46 MG/DL
HGB BLD-MCNC: 13.6 G/DL (ref 12–16)
LDLC SERPL CALC-MCNC: 136 MG/DL
MCH RBC QN AUTO: 31.2 PG (ref 27–34)
MCHC RBC AUTO-ENTMCNC: 33.1 G/DL (ref 32–36)
MCV RBC AUTO: 94 FL (ref 80–100)
PLATELET # BLD AUTO: 271 THOU/UL (ref 140–440)
PMV BLD AUTO: 7.7 FL (ref 7–10)
POTASSIUM BLD-SCNC: 4 MMOL/L (ref 3.5–5)
RBC # BLD AUTO: 4.36 MILL/UL (ref 3.8–5.4)
SODIUM SERPL-SCNC: 140 MMOL/L (ref 136–145)
TRIGL SERPL-MCNC: 74 MG/DL
WBC: 5.3 THOU/UL (ref 4–11)

## 2021-01-06 ASSESSMENT — MIFFLIN-ST. JEOR: SCORE: 1644.44

## 2021-01-08 LAB
BKR LAB AP ABNORMAL BLEEDING: NO
BKR LAB AP BIRTH CONTROL/HORMONES: NORMAL
BKR LAB AP CERVICAL APPEARANCE: NORMAL
BKR LAB AP GYN ADEQUACY: NORMAL
BKR LAB AP GYN INTERPRETATION: NORMAL
BKR LAB AP HPV REFLEX: NORMAL
BKR LAB AP LMP: NORMAL
BKR LAB AP PATIENT STATUS: NORMAL
BKR LAB AP PREVIOUS ABNORMAL: NO
BKR LAB AP PREVIOUS NORMAL: NORMAL
HIGH RISK?: NO
PATH REPORT.COMMENTS IMP SPEC: NORMAL
RESULT FLAG (HE HISTORICAL CONVERSION): NORMAL

## 2021-01-11 ENCOUNTER — COMMUNICATION - HEALTHEAST (OUTPATIENT)
Dept: FAMILY MEDICINE | Facility: CLINIC | Age: 26
End: 2021-01-11

## 2021-01-17 ENCOUNTER — COMMUNICATION - HEALTHEAST (OUTPATIENT)
Dept: FAMILY MEDICINE | Facility: CLINIC | Age: 26
End: 2021-01-17

## 2021-01-17 DIAGNOSIS — R11.0 NAUSEA: ICD-10-CM

## 2021-01-28 ENCOUNTER — COMMUNICATION - HEALTHEAST (OUTPATIENT)
Dept: FAMILY MEDICINE | Facility: CLINIC | Age: 26
End: 2021-01-28

## 2021-01-28 DIAGNOSIS — J45.909 REACTIVE AIRWAY DISEASE: ICD-10-CM

## 2021-01-28 DIAGNOSIS — Z00.00 WELLNESS EXAMINATION: ICD-10-CM

## 2021-05-20 ENCOUNTER — COMMUNICATION - HEALTHEAST (OUTPATIENT)
Dept: FAMILY MEDICINE | Facility: CLINIC | Age: 26
End: 2021-05-20

## 2021-05-27 NOTE — TELEPHONE ENCOUNTER
Call from pt. Pt reports her pharmacy will not refill her Cymbalta due to the instructions. Her previous instructions stated to take 20mg daily. Pt has increased dose as instructed and is now taking 60 mg daily. Medication order edited and prepped for provider review. Pharmacy verified.

## 2021-05-27 NOTE — TELEPHONE ENCOUNTER
No response yet from patient. 3rd attempt to reach her with results and recommendations. Left message to return call.

## 2021-05-27 NOTE — TELEPHONE ENCOUNTER
No response from patient. Second call placed to inform of results and discuss recommendations. Left message to return call.

## 2021-05-27 NOTE — TELEPHONE ENCOUNTER
Phone call to patient to clarify how much Cymbalta she is taking to order refill. Left voicemail to call back.

## 2021-05-28 NOTE — TELEPHONE ENCOUNTER
4th call to patient to give results as well as review the treatment plan. Left message to return call.

## 2021-05-28 NOTE — PROGRESS NOTES
Chief Complaint   Patient presents with     GI Problem     Pt c/o nausea, vomiting, feeling of wanting to throw up, she threw up 3 times in one weeks time. This morning her stomach felt burning and she thought she would throw up again but she did not.        HPI: 23-year-old female, who works at [employer], presents with an episode of stomach feeling poor.  Last week she woke up in the middle the night and had one episode of emesis.  It then went away.  This morning, approximate 3 hours ago, she noticed her stomach hurting globally and she took an ondansetron tablet with good results.  She does not feel nervous or stressed.  Patient strongly denies pregnancy.    ROS: No vomiting today, no rashes, no constipation.  Bowel moods been normal.  No melena or hematochezia.    SH:    reports that she has never smoked. She has never used smokeless tobacco.      FH: The Patient's family history is not on file.     Meds:  Meggan has a current medication list which includes the following prescription(s): albuterol, cetirizine, cyclobenzaprine, duloxetine, fluticasone propionate, ibuprofen, levonorgestrel-ethinyl estradiol, montelukast, omeprazole, ondansetron, sodium chloride, and sulindac.    O:  /62   Pulse (!) 106   Resp 16   Wt 176 lb (79.8 kg)   SpO2 100%   BMI 27.98 kg/m    Alert conversant no acute distress  Skin pink and dry  Abdomen soft nontender no masses no guarding no rebound    A/P:   1. Nausea  The cause for her intermittent nausea cannot be found.  I refilled her Zofran at her request.  Cannot exclude anxiety as a cause.  Will treat with intermittent ondansetron but if not improving would consider further work-up including GI referral.  - ondansetron (ZOFRAN-ODT) 4 MG disintegrating tablet; Take 1 tablet (4 mg total) by mouth every 12 (twelve) hours as needed for nausea.  Dispense: 30 tablet; Refill: 0

## 2021-05-28 NOTE — TELEPHONE ENCOUNTER
RN cannot approve Refill Request    RN can NOT refill this medication med is not covered by policy/route to provider.    Shamar Mcgarry, [facility] Triage/Med Refill 5/2/2019    Requested Prescriptions   Pending Prescriptions Disp Refills     ondansetron (ZOFRAN-ODT) 4 MG disintegrating tablet [Pharmacy Med Name: ONDANSETRON ODT 4 MG TABLET] 30 tablet 0     Sig: DISSOLVE 1 TABLET (4 MG TOTAL) ON THE TONGUE EVERY 12 (TWELVE) HOURS AS NEEDED FOR NAUSEA.       There is no refill protocol information for this order

## 2021-05-31 NOTE — TELEPHONE ENCOUNTER
Who is requesting the letter?  patient  Why is the letter needed? I need a letter with lift and bending restriction for my employer.  How would you like this letter returned? Patient would like to pick this letter up. Please call when ready.  Okay to leave a detailed message? Yes    Patient stated I just received a call from the clinic with my MRI results and I forgot to request this letter.

## 2021-05-31 NOTE — PROGRESS NOTES
Chief Complaint   Patient presents with     Back Pain     Pt c/o nausea back and leg numbness flare up due to her autoimmune x 3 day     Leg Pain       HPI: 24-year-old female presents today with pain in her lumbar spine radiating to both legs since Friday, 5 days ago.  There is no history of trauma, and the pain came on rather suddenly.  Her pains are somewhat vague but she tells me that they radiate from the lumbar spine down the anterior aspect of both legs to the level of the knee.  She states that on Friday she had difficulty ambulating downstairs but today she is fine.  The paresthesias and pain are mild.  She has a past history of neuralgias.    ROS: No bowel or bladder difficulties.  No syncope.  No leg weakness.    SH:    reports that she has never smoked. She has never used smokeless tobacco.      FH: The Patient's family history is not on file.     Meds:  Meggan has a current medication list which includes the following prescription(s): albuterol, cetirizine, cyclobenzaprine, duloxetine, fluticasone propionate, ibuprofen, levonorgestrel-ethinyl estradiol, montelukast, omeprazole, ondansetron, sodium chloride, and sulindac.    O:  /84   Pulse 72   Temp 97.5  F (36.4  C)   Resp 14   Wt 170 lb (77.1 kg)   SpO2 99%   BMI 27.03 kg/m    Alert conversant no acute distress  Skin pink and dry  Examination lumbar spine shows no pain to palpation of the lumbar spine either in the midline or laterally.  She is able to touch her toes, and rotationally she can rotate left to right without difficulty.  In general she is alert conversant and appears comfortable    A/P:   1. Low back pain, unspecified back pain laterality, unspecified chronicity, with sciatica presence unspecified  The patient has relatively new pain with radiation.  Cannot rule out degenerative disc disease, or other causes.  Review of old notes from February 2019 from Dr. Davis shows that he was supposed to see her in 6 weeks but she  did not follow through.  We will have her see Dr. Olmedo who is treating her back pain and her myalgias.  We will also order MRI.  - MR Lumbar Spine Without Contrast; Future  - Ambulatory referral to Spine Care

## 2021-06-01 VITALS — BODY MASS INDEX: 29.27 KG/M2 | WEIGHT: 198.2 LBS

## 2021-06-01 VITALS — BODY MASS INDEX: 29.28 KG/M2 | WEIGHT: 198.25 LBS

## 2021-06-01 NOTE — PROGRESS NOTES
Assessment:     Diagnoses and all orders for this visit:    Chronic bilateral low back pain with bilateral sciatica  -     Ambulatory referral to Physical Therapy    Chronic bilateral thoracic back pain  -     Ambulatory referral to Physical Therapy    Paresthesia of right upper extremity  -     Ambulatory referral to Physical Therapy    Paresthesia of left upper extremity  -     Ambulatory referral to Physical Therapy    Myofascial pain  -     Ambulatory referral to Physical Therapy       Meggan Osei is a 24 y.o. y.o. female with past medical history significant for allergic rhinitis, body aches, cold sensitivity, mild intermittent asthma, vitamin D D deficiency, who presents today for follow-up regarding thoracic and low back pain with bilateral upper and lower extremity paresthesias:    -Overall patient's physical exam is reassuring that she has normal strength and reflexes.  Her pain is most consistent with myofascial type pain with central sensitization.  Cymbalta is helpful.     Plan:     A shared decision making plan was used. The patient's values and choices were respected. Prior medical records from her last visit on 3/8/2019 as well as primary care provider note on 8/13/2019 were reviewed today. The following represents what was discussed and decided upon by the provider and the patient.        -DIAGNOSTIC TESTS: Images were personally reviewed and interpreted.   --MRI of the lumbar spine dated 8/21/2019 is personally reviewed and images interpreted and shown to the patient.  Does show a shallow central disc protrusion at L4-5 with annular fissure resulting in mild spinal canal stenosis.  There is no other high-grade spinal canal or foraminal narrowing.  --Reviewed report of MRI of the cervical spine dated 5/31/2016.  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  There is no abnormal cord signal nor neural foraminal stenosis.  --EMG of bilateral lower  extremities was done on 6/9/2016 with normal results.    -INTERVENTIONS: No interventions at this time.    -MEDICATIONS: Patient is taking Cymbalta 60 mg daily as well as ibuprofen as needed.  -  Discussed side effects of medications and proper use. Patient verbalized understanding.    -PHYSICAL THERAPY: I have reordered physical therapy for the patient.  She will be doing [facility] physical therapy.  She notes that she now has time in her work schedule to be able to do this.  Discussed the importance of core strengthening, ROM, stretching exercises with the patient and how each of these entities is important in decreasing pain.  Explained to the patient that the purpose of physical therapy is to teach the patient a home exercise program.  These exercises need to be performed every day in order to decrease pain and prevent future occurrences of pain.        -PATIENT EDUCATION: We discussed pain management in a multimodal fashion including physical therapy, medication management, and reviewed imaging.    -FOLLOW UP: Patient will follow-up in 6 weeks.  Advised to contact clinic if symptoms worsen or change.    Subjective:     Meggan Osei is a 24 y.o. female who presents today for follow-up regarding low back and thoracic pain as well as bilateral upper and lower dysesthesias.  Patient is taking Cymbalta 60 mg daily and finds that it is somewhat helpful.  She was seen by her primary care provider Dr. Balderrama and at the time was having worsening right-sided low back pain and MRI was ordered and referred to the spine center.  Patient reports that that pain has improved, however she has her baseline pain for which I saw her to visit.  She describes her pain as achy in nature with numbness and tingling as well.  She denies any bowel or bladder changes, fevers, chills, unintentional weight loss.  She was not able to attend physical therapy from her last visit, however now work schedule is improved and she should be able to  do this.    -Treatment to Date: MRI of the cervical spine dated 5/31/2016.  Bilateral lower extremity EMG on 6/9/2016.  Several labs have been drawn.  She has not had physical therapy nor any surgeries.  MRI lumbar spine dated 8/21/2019.    Patient Active Problem List   Diagnosis     Allergic rhinitis     Allergic rhinitis due to pollen     Body aches     Cold sensitivity     Mild intermittent asthma     Mucocele of nasal sinus     Need for desensitization to allergens     Polyp of anterior nasal cavity     Screening for cervical cancer     Sensory disturbance     Vitamin D deficiency       Current Outpatient Medications on File Prior to Encounter   Medication Sig Dispense Refill     albuterol (PROAIR HFA;PROVENTIL HFA;VENTOLIN HFA) 90 mcg/actuation inhaler Inhale 2 puffs.       cetirizine (ZYRTEC) 10 MG tablet Reported on 3/24/2017       DULoxetine (CYMBALTA) 20 MG capsule Take 1 capsule (20 mg total) by mouth daily. Increase up to 3 capsules (60mg daily) as instructed. (Patient taking differently: Take 60 mg by mouth daily. Increase up to 3 capsules (60mg daily) as instructed.      ) 90 capsule 2     fluticasone (FLONASE) 50 mcg/actuation nasal spray 1-2 sprays.       ibuprofen 200 mg cap Take 600 mg by mouth every 6 (six) hours.       levonorgestrel-ethinyl estradiol (LARISSIA) 0.1-20 mg-mcg per tablet Take 1 tablet by mouth daily. 84 tablet 3     montelukast (SINGULAIR) 10 mg tablet Take 1 tablet (10 mg total) by mouth every evening. 90 tablet 0     omeprazole (PRILOSEC) 20 MG capsule Take 20 mg by mouth.       ondansetron (ZOFRAN-ODT) 4 MG disintegrating tablet Take 1 tablet (4 mg total) by mouth every 12 (twelve) hours as needed for nausea. 30 tablet 0     sodium chloride 0.65 % Drop 2 sprays.       cyclobenzaprine (FLEXERIL) 5 MG tablet Take 2 tablets (10 mg total) by mouth every 8 (eight) hours as needed for muscle spasms. 30 tablet 1     sulindac (CLINORIL) 200 MG tablet Take 200 mg by mouth 2 (two) times a  day.       No current facility-administered medications on file prior to encounter.        Allergies   Allergen Reactions     Beta-Blockers (Beta-Adrenergic Blocking Agts) Other (See Comments)     Patient is on allergy injections, Beta Blockers contraindicated. If medically necessary, it is OK to prescribe a Beta Blocker, but the allergy injections will need to be discontinued. No longer on allergy injections. JOAQUIN Moncada [facility] 2/12/2016 8:11 AM  Patient is on allergy injections, Beta Blockers contraindicated. If medically necessary, it is OK to prescribe a Beta Blocker, but the allergy injections will need to be discontinued. No longer on allergy injections. JOAQUIN Moncada [facility] 2/12/2016 8:11 AM  Patient is on allergy injections, Beta Blockers contraindicated. If medically necessary, it is OK to prescribe a Beta Blocker, but the allergy injections will need to be discontinued. No longer on allergy injections. JOAQUIN Moncada [facility] 2/12/2016 8:11 AM  Patient is on allergy injections, Beta Blockers contraindicated. If medically necessary, it is OK to prescribe a Beta Blocker, but the allergy injections will need to be discontinued. No longer on allergy injections. JOAQUIN Moncada [facility] 2/12/2016 8:11 AM       No past medical history on file.     Review of Systems  ROS:  Specifically negative for bowel/bladder dysfunction, balance changes, headache, dizziness, foot drop, fevers, chills, appetite changes, nausea/vomiting, unexplained weight loss. Otherwise 13 systems reviewed are negative. Please see the patient's intake questionnaire from today for details.    Reviewed Social, Family, Past Medical and Past Surgical history with patient, no significant changes noted since prior visit.     Objective:     /76 (Patient Site: Right Arm, Patient Position: Sitting, Cuff Size: Adult Regular)   Pulse 93   Temp 99.7  F (37.6  C) (Oral)   Resp 18   Wt 173 lb 6.4 oz (78.7 kg)   SpO2 98%   BMI 27.57 kg/m      PHYSICAL  EXAMINATION:    --CONSTITUTIONAL: Well developed, well nourished, healthy appearing individual.  --PSYCHIATRIC: Appropriate mood and affect. No difficulty interacting due to temper, social withdrawal, or memory issues.  --SKIN: Lumbar region is dry and intact.   --RESPIRATORY: Normal rhythm and effort. No abnormal accessory muscle breathing patterns noted.   --MUSCULOSKELETAL:  Normal lumbar lordosis noted, no lateral shift.  --GROSS MOTOR: Easily arises from a seated position. Gait is non-antalgic  --LUMBAR SPINE:  Inspection reveals no evidence of deformity. Range of motion is not limited in lumbar flexion, extension, lateral rotation she has tenderness to palpation along her thoracic and lumbar paraspinal muscles.  Straight leg raising in the seated position is negative to radicular pain.  --LOWER EXTREMITY MOTOR TESTING:  Plantar flexion left 5/5, right 5/5   Dorsiflexion left 5/5, right 5/5   Great toe MTP extension left 5/5, right 5/5  Knee flexion left 5/5, right 5/5  Knee extension left 5/5, right 5/5   Hip flexion left 5/5, right 5/5  Hip abduction left 5/5, right 5/5  Hip adduction left 5/5, right 5/5   --NEUROLOGIC: Sensation to light touch is intact in the bilateral L4, L5, and S1 dermatomes.    RESULTS:   Imaging: Lumbar spine imaging was reviewed today. The images were shown to the patient and the findings were explained using a spine model.    Mr Lumbar Spine Without Contrast    Result Date: 8/22/2019  EXAM: MR LUMBAR SPINE WO CONTRAST LOCATION: [facility] DATE/TIME: 8/21/2019 2:41 PM INDICATION: Back pain with lower extremity radiculopathy. COMPARISON: None. TECHNIQUE: Without IV contrast. FINDINGS: Nomenclature is based on 5 lumbar type vertebral bodies. Normal vertebral body heights, alignment and marrow signal. The conus tip is identified at T12/L1. Distal cauda equina nerve roots are unremarkable. Posterior paraspinal soft tissues are within normal limits. T12-L1 through L3/L4: Normal  disc height and signal. No herniation. No facet arthropathy. No spinal canal stenosis. No right neural foraminal stenosis. No left neural foraminal stenosis. L4-L5: Shallow posterior disc protrusion with small annular fissure. No facet arthropathy. Mild spinal canal stenosis. There is encroachment on the right greater than left subarticular zones without wilda stenosis or mechanical mass effect on the descending L5 nerve roots. No right neural foraminal stenosis. No left neural foraminal stenosis. L5-S1: Normal disc height and signal. No herniation. No facet arthropathy. No spinal canal stenosis. No right neural foraminal stenosis. No left neural foraminal stenosis.     1.  At L4/L5, there is a shallow central disc protrusion and annular fissure resulting in mild spinal canal stenosis. 2.  No high-grade spinal canal or foraminal narrowing.

## 2021-06-01 NOTE — PATIENT INSTRUCTIONS - HE
Discussed the importance of core strengthening, ROM, stretching exercises with the patient and how each of these entities is important in decreasing pain.  Explained to the patient that the purpose of physical therapy is to teach the patient a home exercise program.  These exercises need to be performed every day in order to decrease pain and prevent future occurrences of pain.        Please continue duloxetine 60 mg daily.    ~Please call Nurse Navigation line [phone] with any questions or concerns about your treatment plan, if symptoms worsen and you would like to be seen urgently, or if you have problems controlling bladder and bowel function.

## 2021-06-01 NOTE — TELEPHONE ENCOUNTER
I do not remember the visit specifically and it would be improper to change a note to facilitate insurance payment.

## 2021-06-01 NOTE — TELEPHONE ENCOUNTER
Who is calling:  Patient  Reason for Call:  Patient stated she needs Merna Balderrama MD to correct the MRI Lumbar Spine order to say that she did complain of back numbness. Patient stated her insurance is not covering the MRI because he did not state this. Patient stated the order does not need to be faxed anywhere, just re-ordered.  Date of last appointment with primary care: 8/13/19  Okay to leave a detailed message: Yes

## 2021-06-01 NOTE — TELEPHONE ENCOUNTER
Who is calling:  Patient    Reason for Call:  Patient called stating that she now needs the notes she had recently requesting to be edited for the ordered MRI to be sent to her insurance company.    Patient did not have any contact information for her insurance company.  Date of last appointment with primary care: 8/13/2019  Okay to leave a detailed message: Yes

## 2021-06-01 NOTE — TELEPHONE ENCOUNTER
Left message to call back for: patient  Information to relay to patient:  Notes show back pain. Why does she need an additional order and not have it sent anywhere?

## 2021-06-01 NOTE — PROGRESS NOTES
[facility]   Lumbo-Pelvic/Cervico-Thoracic Initial Evaluation    Patient Name: Meggan Osei  Date of evaluation: 9/25/2019 Auth req 2 wks prior to 13th visit with CPT codes  Referral Diagnosis: Myofascial pain   Referring provider: Jose Olmedo*  Visit Diagnosis:     ICD-10-CM    1. Chronic bilateral low back pain with bilateral sciatica M54.42     M54.41     G89.29    2. Chronic bilateral thoracic back pain M54.6     G89.29    3. Paresthesia of right upper extremity R20.2    4. Paresthesia of left upper extremity R20.2    5. Myofascial pain M79.18        Assessment:      Meggan Osei is a 24 y.o. female with PMH significant for allergic rhinitis, body aches, cold sensitivity, mild intermittent asthma, vitamin D D deficiency who presents to therapy today with chief complaints of chronic low back/upper back pain, UE/LE paresthesias.  Pt sx began 2015.  Reflexes are normal, gross trunk ROM mildly limited, hip tightness of ITB, flexors, hamstring bilaterally and neural tension noted BLE.  Pt does demonstrate functional weakness of BLE in that squats and descending stairs demonstrate instability.  She does demonstrate some weakness per [facility] testing today as well.  Pt would benefit from skilled PT to address the above limitations.    POC and pathology of condition were reviewed with patient.  Pt. is in agreement with the Plan of Care  A Home Exercise Program (HEP) was initiated today.  Pt. was instructed in exercises by PT and patient was given a handout with detailed instructions.    Goals:  Pt. will demonstrate/verbalize independence in self-management of condition in : 4 weeks  Pt. will be independent with home exercise program in : 4 weeks    Pt will: demonstrate full gross trunk ROM without increased pain in 4 weeks  Pt will: demonstrate LLE strength per reformer equal to right (All bands >20 reps) to demonstrate improved and normal strength in 4 weeks  Pt will: demonstrate improved  strength per [facility] Lumbar IM by 30# on average in 4 weeks to demonstrate increased core strength      Patient's expectations/goals are realistic.    Barriers to Learning or Achieving Goals:  Chronicity of the problem.        Plan / Patient Instructions:        Plan of Care:   Authorization / Certification Start Date: 09/25/19  Authorization / Certification End Date: 12/25/19  Authorization / Certification Number of Visits: 16  Communication with: Referral Source  Patient Related Instruction: Nature of Condition;Self Care instruction;Basis of treatment;Treatment plan and rationale;Body mechanics;Posture  Times per Week: 1-2  Number of Weeks: 8-12  Number of Visits: Up to 16  Discharge Planning: to self-care with HEP, when PT goals are met or plateau of progress  Precautions / Restrictions : None  Therapeutic Exercise: ROM;Stretching;Strengthening;Other  Therapeutic Exercise : [facility]  Neuromuscular Reeducation: posture;core;balance/proprioception;TNE  Manual Therapy: soft tissue mobilization;myofascial release;joint mobilization;muscle energy      Plan for next visit: [facility] DE, rotary torso, initiate SLUMP SLRs, hip flexion/ITB stretch for HEP, reformer hip strengthening, hip/core strengthening     Subjective:         Social information:   Occupation:  - [employer] - Part time job at a [employer] store   Work Status:Working full time, make smoothies, truck orders, stocking,     History of Present Illness:    Meggan is a 24 y.o. female who presents to therapy today with complaints of chronic back pain.  Variable location, started with edi knee paresthesias, now location is low back, upper back, N/T into elbows and hands also in the knees/feet/toes.  Onset January 2015 - she had sinus surgery but this seems unrelated, CHELO No known. Timing: comes and goes - may happen faster when she is working hard. Alleviating factors: heat/massage, bath  Functional limitations:    Transfers - in/out of bed   Rolling  in bed sometimes painful   Bend   Down Stairs - legs get shakey    Kneel/squat    Severity:   Pain Ratin   Pain rating at best: 4   Pain rating at worst: 9  Quality/Description: sore, N/T    Previous Activity Level: jobs, walk her dog.  Last worked out about a year ago    Pertinent Past Medical History: allergic rhinitis, body aches, cold sensitivity, mild intermittent asthma, vitamin D D deficiency  She has had normal stress test, normal EMG, no history of Lyme's disease.    Associated symptoms:                         Numbness: perceived in legs, reports intact to light touch                        Weakness: perceived in legs     Fracture:    1. History of trauma -    2. Prolonged use of steroids -    3. Age over 70 -    4. History of breast cancer (estrogen inhibiting drugs) -     Cauda Equina Sx    1. Urine retention or incontinence -    2. Fecal incontinence -    3. Saddle anesthesia -    4. Global or Progressive Weakness -     Spine related tumor:    1. Age >50 -    2. Hx of cancer -    3. Unexplained weight loss >10# -    4. Failure with conservative Tx -     Spinal Osteomyelitis/Discitis    1. Recent infection (UTI, skin, other internal) -    2. Fevers/Chills -      Objective:      Note: Items left blank indicates the item was not performed or not indicated at the time of the evaluation.    Patient Outcome Measures :    Modified Oswestry Low Back Pain Disablity Questionnaire  in %: 24     Scores range from 0-100%, where a score of 0% represents minimal pain and maximal function. The minimal clinically important difference is a score reduction of 12%.    Examination  1. Chronic bilateral low back pain with bilateral sciatica     2. Chronic bilateral thoracic back pain     3. Paresthesia of right upper extremity     4. Paresthesia of left upper extremity     5. Myofascial pain       Precautions/Restrictions: None    Posture Observation: Fair.  Increased lumbar lordosis.    Lumbar ROM:    Date: 2019    *Indicate scale AROM   Lumbar Flexion 8 inches   Lumbar Extension Discomfort    Right Left   Lumbar Sidebending     Lumbar Rotation 20% limited 25% limited     Lower Extremity Strength:     Date: 9/25/2019   LE strength/5 Right Left   Hip Flexion (L1-3) 5 5   Hip Extension (L5-S1)     Hip Abduction (L4-5)     Hip Adduction (L2-3)     Hip External Rotation     Hip Internal Rotation     Knee Extension (L3-4) 5 5   Knee Flexion 5 5   Ankle Dorsiflexion (L4-5) 5 5   Great Toe Extension (L5) 5 5   Ankle Plantar flexion (S1) 5 5   Abdominals      Lumbar Sensation    Intact to light touch BLE         LE Reflexes R L   Patellar (L3-4) 2+ 2+   Achilles (S1-2) 2+ 2+   Clonus 0 1       Lumbar Special Tests:   Lumbar Special Tests Right Left   Slump - -   Straight leg raise 60 50   Crossover response - -   JENN Pain in the ant hip Pain in ant hip   FADIR + ant hip + ant hip   Hip Scour Test - -   Fly Test     Jonathan's + +   Hip rotation ROM supine/prone /WNL /WNL   Ely's - -   Prone instability test    Pubic shotgun          Palpation: Pt reports no tenderness to palpation of lumbar/thoracic paraspinals, gluteals    Repeated Motion Testing:  Does not centralize  Does not peripheralize    Passive Mobility - Joint Integrity:  Pain to PAs in lumbar spine    LE Screen:  Mild muscle tension in quads, ITB, hip flexors    Reformer leg press:  Pt demonstrates significant weakness LLE vs RLE (see flowsheet).  Squat: Pt is shakey, unstable, heel rise, wide RAQUEL.         Treatment Today     TREATMENT MINUTES COMMENTS   Evaluation 25 Low complexity lumbar evaluation   Self-care/ Home management     Manual therapy     Neuromuscular Re-education     Therapeutic Activity     Therapeutic Exercises 25 [facility] IM, see ex flowsheet, HEP initiated and discussed [facility] program.   Gait training     Modality__________________                Total 50    Blank areas are intentional and mean the treatment did not include these items.              Rosa  MERY Chan  9/25/2019  11:31 AM

## 2021-06-01 NOTE — TELEPHONE ENCOUNTER
Patient Returning Call  Reason for call:  Returning clinic call  Information relayed to patient:  Patient states that the reason for the MRI being done is not being covered by her insurance, patient wants it clarified that it was done for numbness in the back on the order so that insurance will cover. Doesn't need anything faxed, just needs this on her chart/order.  Patient has additional questions:  No  If YES, what are your questions/concerns:  NA  Okay to leave a detailed message?: Yes

## 2021-06-02 VITALS — BODY MASS INDEX: 28.13 KG/M2 | WEIGHT: 190.5 LBS

## 2021-06-02 VITALS — BODY MASS INDEX: 29.22 KG/M2 | HEIGHT: 67 IN | WEIGHT: 186.2 LBS

## 2021-06-02 VITALS — WEIGHT: 187.31 LBS | BODY MASS INDEX: 27.66 KG/M2

## 2021-06-02 VITALS — BODY MASS INDEX: 27.62 KG/M2 | WEIGHT: 187 LBS

## 2021-06-02 NOTE — PROGRESS NOTES
"[facility] Daily Progress     Patient Name: Meggan Osei  Date: 10/18/2019  Visit #: 4  PTA visit #:  -  Referral Diagnosis: Myofascial pain  Referring provider: Jose Olmedo,   Visit Diagnosis:     ICD-10-CM    1. Chronic bilateral low back pain with bilateral sciatica M54.42     M54.41     G89.29    2. Chronic bilateral thoracic back pain M54.6     G89.29    3. Paresthesia of right upper extremity R20.2    4. Paresthesia of left upper extremity R20.2    5. Myofascial pain M79.18          Assessment:     HEP/POC compliance is  good .     Patient with good tolerance to MEDX. Stretches were reviewed with the patient with cues given for correct technique. Additional reformer exercises added today, patient achieved an appropriate level of fatigue. Patient would benefit from further PT including further strengthening. Patient is non-descript with symptoms, making exercise selection more difficult.     Goal Status:  Pt. will demonstrate/verbalize independence in self-management of condition in : 4 weeks  Pt. will be independent with home exercise program in : 4 weeks    Pt will: demonstrate full gross trunk ROM without increased pain in 4 weeks  Pt will: demonstrate LLE strength per reformer equal to right (All bands >20 reps) to demonstrate improved and normal strength in 4 weeks  Pt will: demonstrate improved strength per MedX Lumbar IM by 30# on average in 4 weeks to demonstrate increased core strength      Plan / Patient Education:     Continue with initial plan of care.      Plan for next visit: MedX DE, rotary torso, reformer hip strengthening, hip/core strengthening for HEP    Subjective:     Pain Rating: \"Same\"    Reports that her legs hurt today L>R. She has been keeping up with her stretches which is helping. Shoulders are feeling better today.     Objective:     Stretches reviewed with patient, cues provided for correct technique.   Patient tolerated new reformer exercises well. " "Exercises done to fatigue.     Exercises:  Exercise #1: NuStep L5 5'  Comment #1: Rotary torso started to L 34#  Exercise #2: Reformer single leg press all bands x20 each side  Comment #2: Calf raises and calf stretch off steps 10x, 30\" stretch  Exercise #3: Treadmill warmup x5min  Comment #3: supine nerve glides x10  Exercise #4: prone press up on elbows x10  Comment #4: cat/cow x10  Exercise #5: reach and roll x10  Comment #5: trunk extension over foam roll x30 sec  Exercise #6: supine ITB stretch x30 seconds  Comment #6: reformer bridge 2R1B x15  Exercise #7: reformer 90/90 pull downs 2R1B x7 (pt fatigued)      Lumbar MedX  Enter Week / Visit #: Wk 2 V 2  Weight (lbs): 145#  Reps (#): 24  Time: 160  ROM (degrees): 0-57  Pain: no increase  Flex:Ext ratio: 2.68:1      Treatment Today     TREATMENT MINUTES COMMENTS   Evaluation     Self-care/ Home management     Manual therapy     Neuromuscular Re-education     Therapeutic Activity     Therapeutic Exercises 28 MEDX, rotary torso, HEP, reformer   Gait training     Modality__________________                Total 28    Blank areas are intentional and mean the treatment did not include these items.     Diann Rodriguez, SPT    I attest that I attended a portion of today s treatment session or was immediately available for today s treatment session to ensure sound judgement of the Physical Therapy student.  Steven LEE, PT  10/18/2019  "

## 2021-06-02 NOTE — PROGRESS NOTES
"[facility] Daily Progress     Patient Name: Meggan Osei  Date: 10/28/2019   Initial Evaluation: 9/25/19  Visit #: 6  PTA visit #:  -  Referral Diagnosis: Myofascial pain  Referring provider: Jose Olmedo, DO  Visit Diagnosis:     ICD-10-CM    1. Chronic bilateral low back pain with bilateral sciatica M54.42     M54.41     G89.29    2. Chronic bilateral thoracic back pain M54.6     G89.29    3. Paresthesia of right upper extremity R20.2    4. Paresthesia of left upper extremity R20.2    5. Myofascial pain M79.18          Assessment:     HEP/POC compliance is  good .     Patient with good tolerance to MEDX with appropriate weight progression since start of treatment. Dead bug with bicycle added to HEP this session in order to progress core strengthening. Trial of lunges with rotation, however patient was unable to perform with good technique. My be related to glute or hip abductor weakness. Plan to add exercises to address those weaknesses next session.  Patient would benefit from further PT for strengthening. Patient is non-descript with symptoms, making exercise selection more difficult.      Goal Status:  Pt. will demonstrate/verbalize independence in self-management of condition in : 4 weeks  Pt. will be independent with home exercise program in : 4 weeks    Pt will: demonstrate full gross trunk ROM without increased pain in 4 weeks  Pt will: demonstrate LLE strength per reformer equal to right (All bands >20 reps) to demonstrate improved and normal strength in 4 weeks  Pt will: demonstrate improved strength per MedX Lumbar IM by 30# on average in 4 weeks to demonstrate increased core strength      Plan / Patient Education:     Continue with initial plan of care.      Plan for next visit: MedX DE, rotary torso, reformer hip strengthening, hip/core strengthening for HEP, glute and hip ABD exercises    Subjective:     Pain Rating: \"Same\"    Her hips were bothering her more over the " "weekend. Sometimes she has achy and shooting pains down the legs. She has been doing her stretches in an attempt to loosen them up.     Objective:     Dead bugs with bicycle were added to HEP with good performance and tolerance.  Lunges with rotation were attempted, however patient demonstrates difficulty with balance during this.    Exercises:  Exercise #1: NuStep L5 5'  Comment #1: Rotary torso started to L 38#  Exercise #2: Reformer single leg press all bands x20 each side  Comment #2: Calf raises and calf stretch off steps 10x, 30\" stretch  Exercise #3: Treadmill warmup x5min  Comment #3: supine nerve glides x10  Exercise #4: prone press up on elbows x10  Comment #4: cat/cow x10  Exercise #5: reach and roll x10  Comment #5: trunk extension over foam roll x30 sec  Exercise #6: supine ITB stretch x30 seconds  Comment #6: reformer bridge 2R1B x15  Exercise #7: reformer 90/90 pull downs 2R x10  Comment #7: bridge with band around knees x10  Exercise #8: dead bug bicycles [id number]  Comment #8: lunges with rotation, x5, challenging      Lumbar MedX  Enter Week / Visit #: Wk 3 V 2  Weight (lbs): 165#  Reps (#): 24  Time: 167  ROM (degrees): 0-57  Pain: no increase  Flex:Ext ratio: 2.68:1      Treatment Today     TREATMENT MINUTES COMMENTS   Evaluation     Self-care/ Home management     Manual therapy     Neuromuscular Re-education     Therapeutic Activity     Therapeutic Exercises 25 MEDX, rotary torso, HEP, see flow sheet   Gait training     Modality__________________                Total 25    Blank areas are intentional and mean the treatment did not include these items.     Diann Rodriguez, SPT    I attest that I attended a portion of today s treatment session or was immediately available for today s treatment session to ensure sound judgement of the Physical Therapy student.  Steven LEE, PT  10/28/2019  "

## 2021-06-02 NOTE — PROGRESS NOTES
"[facility] Daily Progress     Patient Name: Meggan Osei  Date: 10/21/2019   Initial Evaluation: 9/25/19  Visit #: 5  PTA visit #:  -  Referral Diagnosis: Myofascial pain  Referring provider: Jose Olmedo, DO  Visit Diagnosis:     ICD-10-CM    1. Chronic bilateral low back pain with bilateral sciatica M54.42     M54.41     G89.29    2. Chronic bilateral thoracic back pain M54.6     G89.29    3. Paresthesia of right upper extremity R20.2    4. Paresthesia of left upper extremity R20.2    5. Myofascial pain M79.18          Assessment:     HEP/POC compliance is  good .     Patient with good tolerance to MEDX with appropriate weight progression since start of treatment. Bridge added to HEP with a band around the knees as patient has difficulty keeping the knees apart during this exercise. Able to perform with good technique with the band. Patient demonstrates difficulty with 90/90 pull down reformer exercise, therefore resistance was decreased today to achieve appropriate repetitions.  Patient would benefit from further PT for strengthening. Patient is non-descript with symptoms, making exercise selection more difficult.     Goal Status:  Pt. will demonstrate/verbalize independence in self-management of condition in : 4 weeks  Pt. will be independent with home exercise program in : 4 weeks    Pt will: demonstrate full gross trunk ROM without increased pain in 4 weeks  Pt will: demonstrate LLE strength per reformer equal to right (All bands >20 reps) to demonstrate improved and normal strength in 4 weeks  Pt will: demonstrate improved strength per MedX Lumbar IM by 30# on average in 4 weeks to demonstrate increased core strength      Plan / Patient Education:     Continue with initial plan of care.      Plan for next visit: MedX DE, rotary torso, reformer hip strengthening, hip/core strengthening for HEP    Subjective:     Pain Rating: \"Same\"    Reports that her legs hurt today L>R. Has been " "doing her stretches once per day.     Objective:     Bridge added to HEP. Tendency for knees to adduct, band placed around knees to maintain correct form and appropriate level of difficulty.   Reformer pull downs were very difficult. Resistance lessened to achieve 10 reps.     Exercises:  Exercise #1: NuStep L5 5'  Comment #1: Rotary torso started to R 36#  Exercise #2: Reformer single leg press all bands x20 each side  Comment #2: Calf raises and calf stretch off steps 10x, 30\" stretch  Exercise #3: Treadmill warmup x5min  Comment #3: supine nerve glides x10  Exercise #4: prone press up on elbows x10  Comment #4: cat/cow x10  Exercise #5: reach and roll x10  Comment #5: trunk extension over foam roll x30 sec  Exercise #6: supine ITB stretch x30 seconds  Comment #6: reformer bridge 2R1B x15  Exercise #7: reformer 90/90 pull downs 2R x10  Comment #7: bridge with band around knees x10      Lumbar MedX  Enter Week / Visit #: Wk 3 V 1  Weight (lbs): 155#  Reps (#): 24  Time: 170  ROM (degrees): 0-57  Pain: no increase  Flex:Ext ratio: 2.68:1      Treatment Today     TREATMENT MINUTES COMMENTS   Evaluation     Self-care/ Home management     Manual therapy     Neuromuscular Re-education     Therapeutic Activity     Therapeutic Exercises 26 MEDX, rotary torso, HEP, reformer   Gait training     Modality__________________                Total 26    Blank areas are intentional and mean the treatment did not include these items.     Diann Rodriguez, SPT    I attest that I attended a portion of today s treatment session or was immediately available for today s treatment session to ensure sound judgement of the Physical Therapy student.  Steven LEE, PT  10/21/2019  "

## 2021-06-02 NOTE — TELEPHONE ENCOUNTER
Patient called to inform her of no show. Patient had wrong time and was on her way to appointment. Offered later appointment time today, but unable to come because of work. This is the patients first no show. Informed of next appointment session.    Steven LEE, PT

## 2021-06-02 NOTE — PROGRESS NOTES
[facility] Daily Progress     Patient Name: Meggan Osei  Date: 10/30/2019   Initial Evaluation: 9/25/19  Visit #: 7  PTA visit #:  -  Referral Diagnosis: Myofascial pain  Referring provider: Jose Olmedo DO  Visit Diagnosis:     ICD-10-CM    1. Chronic bilateral low back pain with bilateral sciatica M54.42     M54.41     G89.29    2. Chronic bilateral thoracic back pain M54.6     G89.29    3. Paresthesia of right upper extremity R20.2    4. Paresthesia of left upper extremity R20.2    5. Myofascial pain M79.18          Assessment:     HEP/POC compliance is  good .     MEDX retest today did not show an increase in strength, however patient has been able to steadily increase weight each session. She demonstrated increased range of motion on the MEDX with a range of 0-57. Left lower extremity strength has improved since the start of care as she can now complete 20 repetitions of single leg presses on the reformer. Clamshells were added to the HEP this session to address the pateint's hip abductor weakness. Patient would benefit from further PT for strengthening to address this impairment. Patient is non-descript with symptoms, making exercise selection more difficult. She is appropriate to continue with the current POC.      Goal Status:  Pt. will demonstrate/verbalize independence in self-management of condition in : 4 weeks Met, ongoing  Pt. will be independent with home exercise program in : 4 weeks Met, ongoing    Pt will: demonstrate full gross trunk ROM without increased pain in 4 weeks. NT  Pt will: demonstrate LLE strength per reformer equal to right (All bands >20 reps) to demonstrate improved and normal strength in 4 weeks Met on 10/30/19  Pt will: demonstrate improved strength per MedX Lumbar IM by 30# on average in 4 weeks to demonstrate increased core strength, Not Met, reassess in 4 weeks      Plan / Patient Education:     Continue with initial plan of care.      Plan for  "next visit: MedX DE, rotary torso, reformer hip strengthening, hip/core strengthening for HEP, glute and hip ABD exercises    Subjective:     Pain Rating: \"Same\"    Her hips are still bothering her, however she no longer gets shooting pains down the leg. She has been doing the exercises, but only one set due to hip pain. Has noticed less frequent back pain since starting PT.     Objective:     Clamshells added to HEP, cues given for correct technique to keep hips stacked.     Able to perform 20 repetitions of SL press bilaterally this session.    Increased range of motion on MEDX to 0-57 degrees this session.    Exercises:  Exercise #1: NuStep L5 5'  Comment #1: Rotary torso started to L 40#  Exercise #2: Reformer single leg press all bands x20 each side  Comment #2: Calf raises and calf stretch off steps 10x, 30\" stretch  Exercise #3: Treadmill warmup x5min  Comment #3: supine nerve glides x10  Exercise #4: prone press up on elbows x10  Comment #4: cat/cow x10  Exercise #5: reach and roll x10  Comment #5: trunk extension over foam roll x30 sec  Exercise #6: supine ITB stretch x30 seconds  Comment #6: reformer bridge 2R1B x15  Exercise #7: reformer 90/90 pull downs 2R x10  Comment #7: bridge with band around knees x10  Exercise #8: dead bug bicycles [id number]  Comment #8: lunges with rotation, x5, challenging  Exercise #9: reformer SL press x20 bilateral  Comment #9: calmshells with green band x15 bilateral    MED X Testing Lumbar Initial - 19 4 week re-test - 10/30/19   AROM (full ROM 0-72) 0-54 0-57   Max Torque  287 236   Avg Torque  185 147   Flex/ext Ratio (ideal 1.4:1) 2.68:1 2.79:1     Lumbar MedX  Enter Week / Visit #: Wk 4 V 1  Weight (lbs): RETEST MAX: 236 AV #  Reps (#): 22  Time: 165  ROM (degrees): 0-57  Pain: no increase  Flex:Ext ratio: 2.68:1    Treatment Today     TREATMENT MINUTES COMMENTS   Evaluation     Self-care/ Home management     Manual therapy     Neuromuscular Re-education   "   Therapeutic Activity     Therapeutic Exercises 18 MEDX DE, rotary torso, HEP, see flow sheet   Gait training     Modality__________________     Physical Performance Testing 10 MEDX retest         Total 28    Blank areas are intentional and mean the treatment did not include these items.     Diann Rodriguez, SPT    I attest that I attended a portion of today s treatment session or was immediately available for today s treatment session to ensure sound judgement of the Physical Therapy student.  Steven LEE, PT  10/30/2019

## 2021-06-02 NOTE — PROGRESS NOTES
"[facility] Daily Progress     Patient Name: Meggan Osei  Date: 10/9/2019  Visit #: 2  PTA visit #:  -  Referral Diagnosis: Myofascial pain  Referring provider: Jose Olmedo,   Visit Diagnosis:     ICD-10-CM    1. Chronic bilateral low back pain with bilateral sciatica M54.42     M54.41     G89.29    2. Chronic bilateral thoracic back pain M54.6     G89.29    3. Paresthesia of right upper extremity R20.2    4. Paresthesia of left upper extremity R20.2    5. Myofascial pain M79.18          Assessment:     HEP/POC compliance is  good .     Patient with good tolerance to MEDX. No change in symptoms increased or decreased with any activity today. Patient was given nerve glides and lumbar extensions (repeated), to see if she can change LE symptoms. Patient would benefit from further PT including further strengthening. Patient is non-descript with symptoms, making exercise selection more difficult.    Goal Status:  Pt. will demonstrate/verbalize independence in self-management of condition in : 4 weeks  Pt. will be independent with home exercise program in : 4 weeks    Pt will: demonstrate full gross trunk ROM without increased pain in 4 weeks  Pt will: demonstrate LLE strength per reformer equal to right (All bands >20 reps) to demonstrate improved and normal strength in 4 weeks  Pt will: demonstrate improved strength per MedX Lumbar IM by 30# on average in 4 weeks to demonstrate increased core strength      Plan / Patient Education:     Continue with initial plan of care.      Plan for next visit: MedX DE, rotary torso, hip flexion/ITB stretch for HEP, reformer hip strengthening, hip/core strengthening    Subjective:     Pain Rating: \"Same\"    Patient is not feeling good today and did not want to get out of bed. She feels this way when she works too much at her two jobs. 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     Objective:     No Change in LE pain with repeated extensions    Exercises:  Exercise #1: NuStep L5 5'  Comment #1: Rotary torso started to R 30#  Exercise #2: Reformer single leg press all bands [id number]  Comment #2: Calf raises and calf stretch off steps 10x, 30\" stretch  Exercise #3: Treadmill warmup x5min  Comment #3: seated and supine nerve glides x10  Exercise #4: prone press up on elbows x10      Lumbar MedX  Enter Week / Visit #: Wk 1 V 2  Weight (lbs): 140#  Reps (#): 16  Time: 147  ROM (degrees): 0-57  Pain: no increase  Flex:Ext ratio: 2.68:1      Treatment Today     TREATMENT MINUTES COMMENTS   Evaluation     Self-care/ Home management     Manual therapy     Neuromuscular Re-education     Therapeutic Activity     Therapeutic Exercises 27 MEDX, rotary torso, HEP   Gait training     Modality__________________                Total 27    Blank areas are intentional and mean the treatment did not include these items.       Steven LEE  10/9/2019    "

## 2021-06-03 VITALS — BODY MASS INDEX: 27.03 KG/M2 | WEIGHT: 170 LBS

## 2021-06-03 VITALS — WEIGHT: 173.4 LBS | BODY MASS INDEX: 27.57 KG/M2

## 2021-06-03 VITALS — BODY MASS INDEX: 27.98 KG/M2 | WEIGHT: 176 LBS

## 2021-06-04 VITALS
RESPIRATION RATE: 20 BRPM | BODY MASS INDEX: 28.7 KG/M2 | SYSTOLIC BLOOD PRESSURE: 104 MMHG | WEIGHT: 180.5 LBS | TEMPERATURE: 98.1 F | HEART RATE: 94 BPM | OXYGEN SATURATION: 99 % | DIASTOLIC BLOOD PRESSURE: 70 MMHG

## 2021-06-04 NOTE — TELEPHONE ENCOUNTER
Medication Question or Clarification  Who is calling: Patient  What medication are you calling about? (include dose and sig) Silke   Who prescribed the medication?: Merna Balderrama MD  What is your question/concern?: Please note patient states she is not in need of visit for refill. She is out of medication and needs to start on Santos 1/3/19  Pharmacy: [facility]  Okay to leave a detailed message?: Yes  Site CMT - Please call the pharmacy to obtain any additional needed information.

## 2021-06-04 NOTE — PROGRESS NOTES
[facility] Rehabilitation Discharge Summary  Patient Name: Meggan Osei  Date: 1/6/2020  Referral Diagnosis:   Myofascial pain  Referring provider: Jose Olmedo DO  Visit Diagnosis:   1. Chronic bilateral low back pain with bilateral sciatica     2. Chronic bilateral thoracic back pain     3. Paresthesia of right upper extremity     4. Paresthesia of left upper extremity     5. Myofascial pain         Goals:  Pt. will demonstrate/verbalize independence in self-management of condition in : 4 weeks Met, ongoing  Pt. will be independent with home exercise program in : 4 weeks Met, ongoing     Pt will: demonstrate full gross trunk ROM without increased pain in 4 weeks. NT  Pt will: demonstrate LLE strength per reformer equal to right (All bands >20 reps) to demonstrate improved and normal strength in 4 weeks Met on 10/30/19  Pt will: demonstrate improved strength per MedX Lumbar IM by 30# on average in 4 weeks to demonstrate increased core strength, Not Met, reassess in 4 weeks    Patient was seen for 7 visits from 9/25/19 to 10/30/19 with - missed appointments.    The patient met goals and has demonstrated understanding of and independence in the home program for self-care, and progression to next steps.  She will initiate contact if questions or concerns arise.    Therapy will be discontinued at this time.  The patient will need a new referral to resume.    Thank you for your referral.  Steven LEE  1/6/2020  9:08 AM

## 2021-06-05 VITALS
OXYGEN SATURATION: 98 % | DIASTOLIC BLOOD PRESSURE: 84 MMHG | BODY MASS INDEX: 31.34 KG/M2 | SYSTOLIC BLOOD PRESSURE: 118 MMHG | HEIGHT: 66 IN | HEART RATE: 83 BPM | WEIGHT: 195 LBS

## 2021-06-05 NOTE — PROGRESS NOTES
Chief Complaint   Patient presents with     Sore Throat     x 5 days     Cough     x 5 days  denies fevers keeping up all night       HPI:  Meggan Osei is a 24 y.o. female who with hx of asthma complains of moderate cough & wheezing onset 5 days ago, as well as sore throat since yesterday. She uses albuterol PRN for her asthma and this has helped with the wheezing. She has also tried Chloraseptic throat spray and Theraflu. Symptoms are constant in duration.  Denies fever/chills, myalgias, HA, CP, SOB, abd pain, N/V/D, rash, or any other symptoms. Patient denies sick contacts.    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    Social History     Tobacco Use     Smoking status: Never Smoker     Smokeless tobacco: Never Used     Tobacco comment: VAPING   Substance Use Topics     Alcohol use: Not on file       Review of Systems   Constitutional: Negative for chills and fever.   HENT: Positive for sore throat.    Respiratory: Positive for cough and wheezing. Negative for shortness of breath.    Cardiovascular: Negative for chest pain.   Gastrointestinal: Negative for abdominal pain, diarrhea, nausea and vomiting.   Skin: Negative for rash.   All other systems reviewed and are negative.      Vitals:    01/09/20 1332   BP: 104/70   Patient Site: Right Arm   Patient Position: Sitting   Cuff Size: Adult Regular   Pulse: 94   Resp: 20   Temp: 98.1  F (36.7  C)   TempSrc: Oral   SpO2: 99%   Weight: 180 lb 8 oz (81.9 kg)       Physical Exam   Constitutional: She appears well-developed and well-nourished.  Non-toxic appearance.   HENT:   Head:  Normocephalic and atraumatic.   Right Ear: Tympanic membrane, external ear and ear canal normal.   Left Ear: Tympanic membrane, external ear and ear canal normal.   Nose: Nose normal.   Mouth/Throat: Uvula is midline, oropharynx is clear and moist and mucous membranes are normal.   Cardiovascular: Normal rate, regular rhythm and normal heart sounds.   Pulmonary/Chest: Effort normal and breath sounds normal.   Neurological: She is alert.   Skin: Skin is warm and dry.   Psychiatric: She has a normal mood and affect.       Labs:  Recent Results (from the past 72 hour(s))   Rapid Strep A Screen-Throat   Result Value Ref Range    Rapid Strep A Antigen No Group A Strep detected, presumptive negative No Group A Strep detected, presumptive negative       Radiology:  No results found.    Clinical Decision Making:    Lungs CTAB, afebrile. No evidence of pneumonia, strep pharyngitis, acute sinusitis or acute otitis media. Suspect viral etiology. Discussed medications that can be used for symptom management such as Mucinex for congestion/sinus pressure, Zyrtec or Claritin for rhinorrhea, humidifier, plenty of fluids, and rest.   Continue albuterol PRN for wheezing. Lungs CTAB, O2 sat 99%, no resp distress.    At the end of the encounter, I discussed results, diagnosis, medications. Discussed red flags for immediate return to clinic/ER, as well as indications for follow up if no improvement. Patient understood and agreed to plan. Patient was stable for discharge.    1. Viral URI     2. Mild intermittent asthma with exacerbation     3. Throat pain  Rapid Strep A Screen-Throat    Group A Strep, RNA Direct Detection, Throat         Return in about 2 weeks (around 1/23/2020) for Follow up w/ primary care provider if not improved.    CYDNEY Booker, PA-C  Formerly named [facility]

## 2021-06-13 NOTE — TELEPHONE ENCOUNTER
Refill approved    Rx renewed per Medication Renewal Policy. Medication was last renewed on 1/2/20.    Anastasia Hummel, [facility] Triage/Med Refill 12/9/2020     Requested Prescriptions   Pending Prescriptions Disp Refills     SRONYX 0.1-20 mg-mcg per tablet [Pharmacy Med Name: SRONYX 0.10-0.02 MG TABLET] 84 tablet 3     Sig: TAKE 1 TABLET BY MOUTH EVERY DAY       Oral Contraceptives Protocol Passed - 12/8/2020 10:28 AM        Passed - Visit with PCP or prescribing provider visit in last 12 months      Last office visit with prescriber/PCP: 8/13/2019 Merna Balderrama MD OR same dept: Visit date not found OR same specialty: 8/13/2019 Merna Balderrama MD  Last physical: Visit date not found Last MTM visit: Visit date not found   Next visit within 3 mo: Visit date not found  Next physical within 3 mo: Visit date not found  Prescriber OR PCP: Merna Balderrama MD  Last diagnosis associated with med order: 1. Wellness examination  - SRONYX 0.1-20 mg-mcg per tablet [Pharmacy Med Name: SRONYX 0.10-0.02 MG TABLET]; TAKE 1 TABLET BY MOUTH EVERY DAY  Dispense: 84 tablet; Refill: 3    If protocol passes may refill for 12 months if within 3 months of last provider visit (or a total of 15 months).

## 2021-06-14 NOTE — PROGRESS NOTES
Assessment:      Healthy female exam.      Plan:    1. Reactive airway disease  Stable.  Continue current action plan.  - albuterol (PROAIR HFA;PROVENTIL HFA;VENTOLIN HFA) 90 mcg/actuation inhaler; Inhale 2 puffs every 6 (six) hours as needed for wheezing.  Dispense: 1 Inhaler; Refill: 5  - montelukast (SINGULAIR) 10 mg tablet; Take 1 tablet (10 mg total) by mouth every evening.  Dispense: 90 tablet; Refill: 0  - fluticasone propionate (FLONASE) 50 mcg/actuation nasal spray; 1-2 sprays into each nostril 2 (two) times a day.  Dispense: 16 g; Refill: 0  - levonorgestrel-ethinyl estradiol (SRONYX) 0.1-20 mg-mcg per tablet; Take 1 tablet by mouth daily.  Dispense: 84 tablet; Refill: 3    2. Wellness examination  - albuterol (PROAIR HFA;PROVENTIL HFA;VENTOLIN HFA) 90 mcg/actuation inhaler; Inhale 2 puffs every 6 (six) hours as needed for wheezing.  Dispense: 1 Inhaler; Refill: 5  - montelukast (SINGULAIR) 10 mg tablet; Take 1 tablet (10 mg total) by mouth every evening.  Dispense: 90 tablet; Refill: 0  - fluticasone propionate (FLONASE) 50 mcg/actuation nasal spray; 1-2 sprays into each nostril 2 (two) times a day.  Dispense: 16 g; Refill: 0  - levonorgestrel-ethinyl estradiol (SRONYX) 0.1-20 mg-mcg per tablet; Take 1 tablet by mouth daily.  Dispense: 84 tablet; Refill: 3  - HM2(CBC w/o Differential)  - Basic Metabolic Panel  - Lipid Cascade    3. Acute non-recurrent sinusitis, unspecified location  Patient has symptoms of sinusitis and she has had this in the past.  Will start Augmentin, and return if not improving.  - amoxicillin-clavulanate (AUGMENTIN) 875-125 mg per tablet; Take 1 tablet by mouth 2 (two) times a day for 14 days.  Dispense: 28 tablet; Refill: 0     All questions answered.     Subjective:      Meggan Osei is a 25 y.o. female who presents for an annual exam. The patient is sexually active. The patient participates in regular exercise: no. The patient reports that there is not domestic violence in  her life.     Her asthma is well controlled on her current regiment and she would like to continue.    She is sexually active with no difficulties and is satisfied with her current oral contraceptive.    She has a new concern of sinus drainage, maxillary sinus pain, and cough for the past month.  She has past history of sinus infections.    Socially she does not smoke, she currently works at [employer] in [place], and she exercises frequently although not as much with coronavirus.    Healthy Habits:   Regular Exercise: No  Sunscreen Use: Yes  Healthy Diet: Yes  Dental Visits Regularly: No  Seat Belt: Yes  Sexually active: Yes  Self Breast Exam Monthly:Yes  Hemoccults: N/A  Flex Sig: N/A  Colonoscopy: N/A  Lipid Profile: N/A  Glucose Screen: N/A  Prevention of Osteoporosis: N/A  Last Dexa: N/A  Guns at Home:  No      Immunization History   Administered Date(s) Administered     DTP 1994, 1994, 1994, 1994, 09/08/1998     Dtap 03/24/1997, 04/21/1997, 10/06/1997, 09/21/2001, 08/26/2005     HPV Quadrivalent 02/14/2007, 04/04/2008, 08/20/2009, 01/14/2010     Hep B, Peds or Adolescent 09/21/2001, 11/02/2001, 08/20/2003, 08/16/2004     Hep B, historic 10/06/2003     Hepatitis A, Ped/Adol 2 Dose IM (18yr & under) 06/27/2007, 04/04/2008     HiB, historic,unspecified 1994, 1994, 1994     IPV 1994, 1994, 1994, 03/24/1997, 04/21/1997, 10/06/1997, 09/08/1998, 09/21/2001     Influenza, Live, Nasal LAIV3 11/03/2009, 08/22/2011     Influenza, Seasonal, Inj PF IIV3 10/03/2013, 11/04/2015     Influenza, inj, historic,unspecified 10/24/2007, 10/01/2015     MMR 09/13/1995, 02/03/1998, 09/08/1998, 09/21/2001     Meningococcal MCV4P 08/20/2009, 08/22/2011     Meningococcal MPSV4, SQ 08/28/2006     Td, Adult, Absorbed 08/20/2003     Tdap 04/04/2008, 12/27/2019     Varicella 10/30/2000, 04/04/2008     Immunization status: up to date and documented.    No exam data  present    Gynecologic History  Patient's last menstrual period was 11/09/2020 (approximate).  Contraception: OCP (estrogen/progesterone)  Last Pap: 2015. Results were: normal  Last mammogram: NA. Results were: NA      OB History   No obstetric history on file.       Current Outpatient Medications   Medication Sig Dispense Refill     albuterol (PROAIR HFA;PROVENTIL HFA;VENTOLIN HFA) 90 mcg/actuation inhaler Inhale 2 puffs.       cetirizine (ZYRTEC) 10 MG tablet Reported on 3/24/2017       fluticasone (FLONASE) 50 mcg/actuation nasal spray 1-2 sprays.       ibuprofen 200 mg cap Take 600 mg by mouth every 6 (six) hours.       montelukast (SINGULAIR) 10 mg tablet Take 1 tablet (10 mg total) by mouth every evening. 90 tablet 0     omeprazole (PRILOSEC) 20 MG capsule Take 20 mg by mouth.       ondansetron (ZOFRAN-ODT) 4 MG disintegrating tablet Take 1 tablet (4 mg total) by mouth every 12 (twelve) hours as needed for nausea. 30 tablet 0     sodium chloride 0.65 % Drop 2 sprays.       SRONYX 0.1-20 mg-mcg per tablet TAKE 1 TABLET BY MOUTH EVERY DAY 84 tablet 0     cyclobenzaprine (FLEXERIL) 5 MG tablet Take 2 tablets (10 mg total) by mouth every 8 (eight) hours as needed for muscle spasms. 30 tablet 1     DULoxetine (CYMBALTA) 20 MG capsule Take 1 capsule (20 mg total) by mouth daily. Increase up to 3 capsules (60mg daily) as instructed. (Patient taking differently: Take 60 mg by mouth daily. Increase up to 3 capsules (60mg daily) as instructed.      ) 90 capsule 2     sulindac (CLINORIL) 200 MG tablet Take 200 mg by mouth 2 (two) times a day.       No current facility-administered medications for this visit.      No past medical history on file.  No past surgical history on file.  Beta-blockers (beta-adrenergic blocking agts)  No family history on file.  Social History     Socioeconomic History     Marital status: Single     Spouse name: Not on file     Number of children: Not on file     Years of education: Not on file  "    Highest education level: Not on file   Occupational History     Not on file   Social Needs     Financial resource strain: Not on file     Food insecurity     Worry: Not on file     Inability: Not on file     Transportation needs     Medical: Not on file     Non-medical: Not on file   Tobacco Use     Smoking status: Never Smoker     Smokeless tobacco: Never Used     Tobacco comment: VAPING   Substance and Sexual Activity     Alcohol use: Not on file     Drug use: Not on file     Sexual activity: Not on file   Lifestyle     Physical activity     Days per week: Not on file     Minutes per session: Not on file     Stress: Not on file   Relationships     Social connections     Talks on phone: Not on file     Gets together: Not on file     Attends [employer] service: Not on file     Active member of club or organization: Not on file     Attends meetings of clubs or organizations: Not on file     Relationship status: Not on file     Intimate partner violence     Fear of current or ex partner: Not on file     Emotionally abused: Not on file     Physically abused: Not on file     Forced sexual activity: Not on file   Other Topics Concern     Not on file   Social History Narrative     Not on file       Review of Systems  Review of Systems          Objective:         Vitals:    01/06/21 1115   BP: 118/84   Pulse: 83   SpO2: 98%   Weight: 195 lb (88.5 kg)   Height: 5' 6.2\" (1.681 m)     Body mass index is 31.28 kg/m .    Physical  Physical Exam     Constitutional:    --Vitals as above  --No acute distress  Eyes-  --Sclera noninjected  --Lids and conjunctiva normal  ENT-  --TMs clear  --Sclera noninjected  --Pharynx moderately erythematous with green PND  Neck-  --Neck supple with no cervical lymphadenopathy  Lungs-  --Clear to Auscultation  Breast-  --With my nurse Yuval in the room both breasts were examined and there were no dominant masses no axillary masses.  Breast self-exam encouraged.  Heart-  --Regular rate and " rhythm  Abdomen--  --Soft, non-tender, non-distended  GYN-  --External genitalia normal, cervix normal nulliparous, Pap smear performed, vaginal rugae normal, no adnexal masses, no cervical motion tenderness.  Skin-  --Pink and dry  Psych-  --Alert and oriented  --Normal affect

## 2021-06-14 NOTE — TELEPHONE ENCOUNTER
RN cannot approve Refill Request    RN can NOT refill this medication med is not covered by policy/route to provider. Last office visit: 8/13/2019 Merna Balderrama MD Last Physical: 1/6/2021 Last MTM visit: Visit date not found Last visit same specialty: 8/13/2019 Merna Balderrama MD.  Next visit within 3 mo: Visit date not found  Next physical within 3 mo: Visit date not found      Madison Landry, [facility] Triage/Med Refill 1/17/2021    Requested Prescriptions   Pending Prescriptions Disp Refills     ondansetron (ZOFRAN-ODT) 4 MG disintegrating tablet 30 tablet 0     Sig: Take 1 tablet (4 mg total) by mouth every 12 (twelve) hours as needed for nausea.       There is no refill protocol information for this order

## 2021-06-14 NOTE — TELEPHONE ENCOUNTER
Refill Approved    Rx renewed per Medication Renewal Policy. Medication was last renewed on 1/6/21, last OV 1/6/21.    Madison Landry, [facility] Triage/Med Refill 1/30/2021     Requested Prescriptions   Pending Prescriptions Disp Refills     fluticasone propionate (FLONASE) 50 mcg/actuation nasal spray [Pharmacy Med Name: FLUTICASONE PROP 50 MCG SPRAY] 16 g 0     Sig: INHALE 1-2 SPRAYS INTO EACH NOSTRIL 2 (TWO) TIMES A DAY.       Nasal Steroid Refill Protocol Passed - 1/28/2021 12:31 PM        Passed - Patient has had office visit/physical in last 2 years     Last office visit with prescriber/PCP: 8/13/2019 OR same dept: Visit date not found OR same specialty: 8/13/2019 Merna Balderrama MD Last physical: 1/6/2021 Last MTM visit: Visit date not found    Next appt within 3 mo: Visit date not found  Next physical within 3 mo: Visit date not found  Prescriber OR PCP: Merna Balderrama MD  Last diagnosis associated with med order: 1. Reactive airway disease  - fluticasone propionate (FLONASE) 50 mcg/actuation nasal spray [Pharmacy Med Name: FLUTICASONE PROP 50 MCG SPRAY]; INHALE 1-2 SPRAYS INTO EACH NOSTRIL 2 (TWO) TIMES A DAY.  Dispense: 16 g; Refill: 0    2. Wellness examination  - fluticasone propionate (FLONASE) 50 mcg/actuation nasal spray [Pharmacy Med Name: FLUTICASONE PROP 50 MCG SPRAY]; INHALE 1-2 SPRAYS INTO EACH NOSTRIL 2 (TWO) TIMES A DAY.  Dispense: 16 g; Refill: 0     If protocol passes may refill for 12 months if within 3 months of last provider visit (or a total of 15 months).

## 2021-06-17 NOTE — PATIENT INSTRUCTIONS - HE
"Patient Instructions by Steven Saldana, PT at 10/21/2019 11:30 AM     Author: Stveen Saldana PT Service: -- Author Type: Physical Therapist    Filed: 10/21/2019 11:53 AM Encounter Date: 10/21/2019 Status: Signed    : Steven Saldana PT (Physical Therapist)        BRIDGING    While lying on your back, tighten your lower abdominals, squeeze your buttocks and then raise your buttocks off the floor/bed as creating a \"Bridge\" with your body.    Do with band around your knees    Hold 3-5 seconds  X 10-20  1X/day              "

## 2021-06-17 NOTE — TELEPHONE ENCOUNTER
Call pt and tell her the vaccine is still experimental and authorized for emergency use.  Full studies have not been complete so it complete effects on the immune system are still being assessed.  Tell her I got the vaccine myself and I think it is likely safe.

## 2021-06-17 NOTE — PATIENT INSTRUCTIONS - HE
Patient Instructions by Steven Saldana PT at 10/9/2019  2:30 PM     Author: Steven Saldana PT Service: -- Author Type: Physical Therapist    Filed: 10/9/2019  2:57 PM Encounter Date: 10/9/2019 Status: Addendum    : Steven Saldana PT (Physical Therapist)    Related Notes: Original Note by Steven Saldana PT (Physical Therapist) filed at 10/9/2019  2:56 PM           HAMSTRING STRETCH - SUPINE    While lying on your back, raise up your leg and hold the back of your knee until a stretch is felt.    Can do seated as well.    10-15 repetitions.    2-3X/day      PRONE ON ELBOWS - SAI    Lying face down, slowly raise up and prop yourself up on your elbows.    Hold 10 sec  Relax down 5 seconds    10X  2X/day      STANDING EXTENSIONS    Start by standing and place your hands on your hips with your thumbs grasping your low back. Lean back to arch your back then return to starting position. Use your thumbs to help isolate where you want to bend.     X 10  2-3X/day

## 2021-06-17 NOTE — PATIENT INSTRUCTIONS - HE
Patient Instructions by Steven Saldana PT at 10/30/2019  3:30 PM     Author: Steven Saldana PT Service: -- Author Type: Physical Therapist    Filed: 10/30/2019  3:59 PM Encounter Date: 10/30/2019 Status: Signed    : Steven Saldana, PT (Physical Therapist)        CLAM SHELLS    While lying on your side with your knees bent, draw up the top knee while keeping contact of your feet together.    Do not let your pelvis roll back during the lifting movement.      Do with band above the knees.     15 repetitions  1x/day

## 2021-06-17 NOTE — PATIENT INSTRUCTIONS - HE
Patient Instructions by Adriana Porter PA-C at 1/9/2020  1:10 PM     Author: Adriana Porter PA-C Service: -- Author Type: Physician Assistant    Filed: 1/9/2020  1:55 PM Encounter Date: 1/9/2020 Status: Signed    : Adriana Porter PA-C (Physician Assistant)       Patient Education     Viral Upper Respiratory Illness (Adult)  You have a viral upper respiratory illness (URI), which is another term for the common cold. This illness is contagious during the first few days. It is spread through the air by coughing and sneezing. It may also be spread by direct contact (touching the sick person and then touching your own eyes, nose, or mouth). Frequent handwashing will decrease risk of spread. Most viral illnesses go away within 7 to 10 days with rest and simple home remedies. Sometimes the illness may last for several weeks. Antibiotics will not kill a virus, and they are generally not prescribed for this condition.    Home care    If symptoms are severe, rest at home for the first 2 to 3 days. When you resume activity, don't let yourself get too tired.    Avoid being exposed to cigarette smoke (yours or others).    You may use acetaminophen or ibuprofen to control pain and fever, unless another medicine was prescribed. If you have chronic liver or kidney disease, have ever had a stomach ulcer or gastrointestinal bleeding, or are taking blood-thinning medicines, talk with your healthcare provider before using these medicines. Aspirin should never be given to anyone under 18 years of age who is ill with a viral infection or fever. It may cause severe liver or brain damage.    Your appetite may be poor, so a light diet is fine. Avoid dehydration by drinking 6 to 8 glasses of fluids per day (water, soft drinks, juices, tea, or soup). Extra fluids will help loosen secretions in the nose and lungs.    Over-the-counter cold medicines will not shorten the length of time youre sick, but they may  be helpful for the following symptoms: cough, sore throat, and nasal and sinus congestion. (Note: Do not use decongestants if you have high blood pressure.)  Follow-up care  Follow up with your healthcare provider, or as advised.  When to seek medical advice  Call your healthcare provider right away if any of these occur:    Cough with lots of colored sputum (mucus)    Severe headache; face, neck, or ear pain    Difficulty swallowing due to throat pain    Fever of 100.4 F (38 C) or higher, or as directed by your healthcare provider  Call 911  Call 911 if any of these occur:    Chest pain, shortness of breath, wheezing, or difficulty breathing    Coughing up blood    Inability to swallow due to throat pain  Date Last Reviewed: 9/13/2015 2000-2017 [employer]. [address] [place] [address], [place] [address]. All rights reserved. This information is not intended as a substitute for professional medical care. Always follow your healthcare professional's instructions.

## 2021-06-17 NOTE — TELEPHONE ENCOUNTER
I can't make that assessment.  OK for pt to see psychologist.  I am not treating her for this. Pt amb an additional 100 feet using RW/100 feet

## 2021-06-17 NOTE — PATIENT INSTRUCTIONS - HE
Patient Instructions by Steven Saldana PT at 10/16/2019  2:30 PM     Author: Steven Saldana PT Service: -- Author Type: Physical Therapist    Filed: 10/16/2019  2:54 PM Encounter Date: 10/16/2019 Status: Addendum    : Steven Saldana PT (Physical Therapist)    Related Notes: Original Note by Steven Saldana PT (Physical Therapist) filed at 10/16/2019  2:52 PM        CAT AND COW    While on your hands and knees in a crawl position, raise up your back and arch it towards the ceiling like an angry cat.    Next return to a lowered position and arch your back the opposite direction.    10 repetitions  2-3x/day      SIDELYING TRUNK ROTATION    While lying on your side with your arms out-stretched in front of your body, slowly twist your upper body to the side and rotated your spine. Your arms and head should also be rotating along with the spine as shown. Follow your hand with your eyes.    10 repetitions each side  2-3x/day      ILIOTIBIAL BAND STRETCH WITH BELT - ITB    Loop a belt around your foot. While lying on your back and leg up in front of you and knee straight, bring your leg across midline for a gentle stretch felt along your outer thigh.    Can do with knee bent.    Hold 30 seconds. 2-3x/side  2-3x/day

## 2021-06-18 NOTE — PROGRESS NOTES
DATE OF SERVICE: 06/07/2018    SUBJECTIVE:  A very pleasant 22-year-old female presents today as a new patient.   She normally was getting her care at [facility] but has changed due to the  change in health insurance.  She has a history of multiple medical issues and the  patient is somewhat vague about all the issues that she has, but reviewing record  shows old records from the [facility] clinic shows that she has a history of  chronic pain, history of myalgias and she has seen multiple specialists including  rheumatology in the past.  Currently, taking sulindac for pain, zolpidem as needed,  tramadol as needed, ondansetron and montelukast.    ALLERGIES:  Include beta blockers, dust mites, grass weed.      She has past hospitalizations for sinusitis.    FAMILY HISTORY:  Notable for heart disease and cancer, asthma and depression.      Socially, she works at [employer], is going back for a master's degree.  She drinks  alcohol and does not smoke.      Twelve systems were reviewed, notable for headaches, dizziness, lightheadedness,  loss of hearing, ringing in the ears, pain in her eyes, easily tired, nasal  discharge, frequent sore throat, pain in the neck, swelling of her muscles, swelling  of her joints, weakness, restless legs, dry cough, nausea, constipation, difficulty  sleeping.   Her last menstrual period was 05/15/2018, periods every 4 weeks with 2  to 3 days in duration.  Currently using oral contraceptives.    OBJECTIVE:  Examination shows 120/70, pulse 84, respiration 18.  The HEENT shows TMs  clear, sclerae not injected, pharynx nonerythematous.  Neck is supple, no  lymphadenopathy, no thyromegaly.  Lungs clear to auscultation.  No respiratory  distress.  Heart:  Regular rhythm, normal S1, normal, S2.  Abdomen:  Soft,  nontender, no mass, no guarding, no rebound.  Skin:  Pink and dry with no obvious  rashes.  Neurologically:  She moves all 4 extremities and pupils equal, round, react  to  light and accommodation.  Psychiatrically she is alert, conversant, makes good  eye contact.  Speech nonpressured and she is oriented x3.    ASSESSMENT:  1.  Intermittent asthma.  2.  Multiple myalgias.  3.  I discussed medical management of these issues in detail.  In addition, we will  continue her sulindac at 200 mg b.i.d.  4.  Will not prescribed tramadol or zolpidem.  Montelukast as needed.  5.  Laboratory.  6.  Physical exam.  7.  Pap smear is current up to date, according to the patient, although records are  currently unavailable.  8.  See back in 1 year.      NATHANIEL GONZALES MD  pa  D 06/07/2018 16:31:36  T 06/07/2018 19:03:54  R 06/07/2018 19:03:54  [id number]        cc:NATHANIEL KONG MD

## 2021-06-19 NOTE — PROGRESS NOTES
DATE OF SERVICE: 07/11/2018    The patient is a delightful 22-year-old female who works at [employer].  She has  continuous mild cutaneous nerve pain in both upper legs.  Old records reviewed  including records from 06/06/2018 from [facility] showing that she has had  extensive workups including rheumatology, neurology and they in summary the exam  showed that basically they could not find any reason for the pain.  She describes  the pain as shooting, in both upper legs, fleeting and unpredictable.    SOCIAL HISTORY:  She does not smoke.      The old EMGs will have been noted to be negative.    OBJECTIVE:  Examination shows patient alert, conversant, in no acute distress.   Examination of both legs show no focal neurological deficits.  No pain to palpation.   The patient able to ambulate without difficulty.      ASSESSMENT:  Myalgias and neuralgias.      PLAN:  We discussed decreasing Cymbalta as there is no documented reason why she  should be on it in spite of the fact that she was placed by her primary care  physician at [facility].  She agreed, since they do not make a 10 mg tablet, she  agreed to go to 20 mg every other day with the thought that we can wean her off.   She agreed to follow up in 90 days for recheck.      NATHANIEL GONZALES MD  da  ROSA 07/11/2018 16:36:14  T 07/11/2018 19:12:54  R 07/11/2018 19:12:54  [id number]        cc:NATHANIEL KONG MD

## 2021-06-20 NOTE — PROGRESS NOTES
Chief Complaint   Patient presents with     Medication Management       HPI: Betty presents today for recheck of her myalgias, as well as her allergic rhinitis and reactive airway disease.  All 3 are stable.  She continues to take Singulair, Zofran as needed, Cymbalta for her myalgias, and Zyrtec for her seasonal allergies.    Socially she continues to work at [employer] and does not smoke.    ROS: No wheezing.  No fever vomiting or diarrhea.  Occasional nausea.      SH:    reports that she has never smoked. She has never used smokeless tobacco.      FH: The Patient's family history is not on file.     Meds:  Meggan has a current medication list which includes the following prescription(s): albuterol, cetirizine, duloxetine, fluticasone, larissia, montelukast, omeprazole, ondansetron, sodium chloride, and sulindac.    O:  /72  Pulse 82  Resp 16  Wt 187 lb 5 oz (85 kg)  SpO2 98%  BMI 27.66 kg/m2  HEENT grossly normal  Lungs clear  Heart regular  Skin Pink and dry    A/P:   1. Myalgia  -The patient notes that the day she does not take her Cymbalta she has a mild increased symptoms so we have gone to 20 mg daily.  - DULoxetine (CYMBALTA) 20 MG capsule; Take 1 capsule (20 mg total) by mouth daily.  Dispense: 90 capsule; Refill: 1  - HM2(CBC w/o Differential)  - Basic Metabolic Panel    2. Nausea  - ondansetron (ZOFRAN-ODT) 4 MG disintegrating tablet; Take 1 tablet (4 mg total) by mouth every 12 (twelve) hours as needed for nausea.  Dispense: 30 tablet; Refill: 0    3. Reactive airway disease  - montelukast (SINGULAIR) 10 mg tablet; Take 1 tablet (10 mg total) by mouth every evening.  Dispense: 90 tablet; Refill: 0  - HM2(CBC w/o Differential)  - Basic Metabolic Panel    4.  Seasonal allergies  -Continue histamine blocker.    Return to clinic 6 months

## 2021-06-21 NOTE — LETTER
Letter by Merna Balderrama MD at      Author: Merna Balderrama MD Service: -- Author Type: --    Filed:  Encounter Date: 1/11/2021 Status: (Other)         Meggan Osei  [address]  [place] [address]            January 11, 2021        Dear Ms. Osei,        Below are the results from your recent visit:    Resulted Orders   HM2(CBC w/o Differential)   Result Value Ref Range    WBC 5.3 4.0 - 11.0 thou/uL    RBC 4.36 3.80 - 5.40 mill/uL    Hemoglobin 13.6 12.0 - 16.0 g/dL    Hematocrit 41.1 35.0 - 47.0 %    MCV 94 80 - 100 fL    MCH 31.2 27.0 - 34.0 pg    MCHC 33.1 32.0 - 36.0 g/dL    RDW 11.9 11.0 - 14.5 %    Platelets 271 140 - 440 thou/uL    MPV 7.7 7.0 - 10.0 fL   Basic Metabolic Panel   Result Value Ref Range    Sodium 140 136 - 145 mmol/L    Potassium 4.0 3.5 - 5.0 mmol/L    Chloride 107 98 - 107 mmol/L    CO2 27 22 - 31 mmol/L    Anion Gap, Calculation 6 5 - 18 mmol/L    Glucose 80 70 - 125 mg/dL    Calcium 8.8 8.5 - 10.5 mg/dL    BUN 7 (L) 8 - 22 mg/dL    Creatinine 0.96 0.60 - 1.10 mg/dL    GFR MDRD Af Amer >60 >60 mL/min/1.73m2    GFR MDRD Non Af Amer >60 >60 mL/min/1.73m2    Narrative    Fasting Glucose reference range is 70-99 mg/dL per  American Diabetes Association (ADA) guidelines.   Lipid Cascade   Result Value Ref Range    Cholesterol 197 <=199 mg/dL    Triglycerides 74 <=149 mg/dL    HDL Cholesterol 46 (L) >=50 mg/dL    LDL Calculated 136 (H) <=129 mg/dL    Patient Fasting > 8hrs? No          Meggan, your laboratory results look normal.  That is good news.  Your Pap smear results are not back from of the laboratory but you will be notified soon.  If you do not hear anything within the next week please let me know.  Otherwise your next Pap smear is due in 3 years.    Thank you for coming to visit.    Sincerely,        AURY Balderrama MD, [facility]  [facility]  [phone]

## 2021-06-22 NOTE — PROGRESS NOTES
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  She denies any heavy lifting.  The pain is in the located in the mid to low back.  Occasionally radiates down the buttocks to the backside of the legs.    ROS: She denies any bowel or bladder dysfunction.  No weakness.  She is able to ambulate without difficulty.    SH:    reports that  has never smoked. she has never used smokeless tobacco.      FH: The Patient's family history is not on file.     Meds:  Meggan has a current medication list which includes the following prescription(s): albuterol, cetirizine, cyclobenzaprine, duloxetine, fluticasone, levonorgestrel-ethinyl estradiol, montelukast, omeprazole, ondansetron, sodium chloride, and sulindac.    O:  /80   Pulse 86   Resp 16   Wt 187 lb (84.8 kg)   SpO2 100%   BMI 27.62 kg/m    Examination shows patient is alert conversant no acute distress  Mild pain to deep palpation over the muscles of the midportion of the lower spine.  Decreased range of motion flexion.  Normal range of motion and rotation.  Negative straight leg raise.  She is able ambulate and there is no lower motor neuron strength deficits.    A/P:   1. Acute bilateral low back pain without sciatica  Ibuprofen for pain  Light activity with no heavy lifting  - cyclobenzaprine (FLEXERIL) 5 MG tablet; Take 2 tablets (10 mg total) by mouth every 8 (eight) hours as needed for muscle spasms.  Dispense: 30 tablet; Refill: 1

## 2021-06-24 NOTE — PATIENT INSTRUCTIONS - HE
I have written a prescription for duloxetine 20 mg.  I like you to take this for 1 week and then increase to 40 mg.  After 1 more week increase to 60 mg and stay on this dose.    I have ordered some labs for you.  Once I am able to review the labs I will have 1 of our nurses give you a call with results and recommendations.    Discussed the importance of core strengthening, ROM, stretching exercises with the patient and how each of these entities is important in decreasing pain.  Explained to the patient that the purpose of physical therapy is to teach the patient a home exercise program.  These exercises need to be performed every day in order to decrease pain and prevent future occurrences of pain.        ~Please call Nurse Navigation line [phone] with any questions or concerns about your treatment plan, if symptoms worsen and you would like to be seen urgently, or if you have problems controlling bladder and bowel function.

## 2021-06-24 NOTE — PROGRESS NOTES
"Chief Complaint   Patient presents with     Follow Up     Pt stopped taking Cymbalta (by accident/ she just forgot about it) one month ago and would like to discuss       HPI: 23-year-old female presents today with a vague complaint of \"nerve pain\" for the past 2-3 weeks.  She tells me that she \"forgot\" to take her Cymbalta recently for the past month.  She is been undergoing quite a bit of work stress and this may have caused her to be forgetful.  Since that time she has had vague short-lived and random paresthesias, and feelings of \"pinpricks\" over her body.    ROS: No syncope.  No shortness of breath or chest pain.  No difficulty ambulating.    SH:    reports that  has never smoked. she has never used smokeless tobacco.      FH: The Patient's family history is not on file.     Meds:  Meggan has a current medication list which includes the following prescription(s): albuterol, cetirizine, cyclobenzaprine, duloxetine, fluticasone, levonorgestrel-ethinyl estradiol, montelukast, omeprazole, ondansetron, sodium chloride, and sulindac.    O:  /82   Pulse 83   Resp 16   Wt 190 lb 8 oz (86.4 kg)   SpO2 100%   BMI 28.13 kg/m    Alert conversant no acute distress  Skin pink and dry  Neurologically she moves all 4 extremities is able to get up and out of a chair easily and ambulates without difficulty  Pupils are equal and reactive bilaterally    A/P:   1. Nerve pain  The patient has a vague symptoms of nerve pain which may be consistent with fibromyalgia.  I also discussed with her the use of Cymbalta and the fact that she came off of precipitously may have caused some of the symptoms.  She is insistent that she needs some type of a \"full workup\".  She would benefit from physiatry evaluation.  I explored anxiety with her in detail and currently does not feel markedly anxious.  - Ambulatory referral to [facility]                                          "

## 2021-06-24 NOTE — PROGRESS NOTES
ASSESSMENT: Meggan Osei is a 23 y.o. female presents for consultation at the request of HE PCP NATHANIEL Balderrama MD, with past medical history significant for allergic rhinitis, body aches, cold sensitivity, mild intermittent asthma, vitamin D D deficiency, who presents today for new patient evaluation of pain of multiple areas throughout the body:     -Overall the patient's physical exam is very reassuring that she has normal strength and reflexes.  It does appear that she has components of central sensitization throughout much of her body.  Her pain is likely myofascial in nature.    Patient is neurologically intact on exam. No myelopathic or red flag symptoms.      JANICE Score: 36  NDI Score: 34    WHO 5: 7       Diagnoses and all orders for this visit:    Myofascial pain  -     CCP Antibodies  -     Vitamin B12  -     Vitamin D 25 hydroxy  -     Erythrocyte Sedimentation Rate  -     C-Reactive Protein  -     Ambulatory referral to Physical Therapy  -     DULoxetine (CYMBALTA) 20 MG capsule  Dispense: 90 capsule; Refill: 2    Cervicalgia  -     CCP Antibodies  -     Vitamin B12  -     Vitamin D 25 hydroxy  -     Erythrocyte Sedimentation Rate  -     C-Reactive Protein  -     Ambulatory referral to Physical Therapy  -     DULoxetine (CYMBALTA) 20 MG capsule  Dispense: 90 capsule; Refill: 2    Chronic bilateral thoracic back pain  -     CCP Antibodies  -     Vitamin B12  -     Vitamin D 25 hydroxy  -     Erythrocyte Sedimentation Rate  -     C-Reactive Protein  -     Ambulatory referral to Physical Therapy  -     DULoxetine (CYMBALTA) 20 MG capsule  Dispense: 90 capsule; Refill: 2    Chronic bilateral low back pain with bilateral sciatica  -     CCP Antibodies  -     Vitamin B12  -     Vitamin D 25 hydroxy  -     Erythrocyte Sedimentation Rate  -     C-Reactive Protein  -     Ambulatory referral to Physical Therapy  -     DULoxetine (CYMBALTA) 20 MG capsule  Dispense: 90 capsule; Refill: 2    Paresthesia of right  upper extremity  -     CCP Antibodies  -     Vitamin B12  -     Vitamin D 25 hydroxy  -     Erythrocyte Sedimentation Rate  -     C-Reactive Protein  -     Ambulatory referral to Physical Therapy  -     DULoxetine (CYMBALTA) 20 MG capsule  Dispense: 90 capsule; Refill: 2    Paresthesia of left upper extremity  -     CCP Antibodies  -     Vitamin B12  -     Vitamin D 25 hydroxy  -     Erythrocyte Sedimentation Rate  -     C-Reactive Protein  -     Ambulatory referral to Physical Therapy  -     DULoxetine (CYMBALTA) 20 MG capsule  Dispense: 90 capsule; Refill: 2       PLAN:  Reviewed spine anatomy and disease process. Discussed diagnosis and treatment options with the patient today. A shared decision making model was used. The patient's values and choices were respected. The following represents what was discussed and decided upon by the provider and the patient.     -DIAGNOSTIC TESTS:  Images were personally reviewed and interpreted and explained to patient today using spine model.   --Reviewed report of MRI of the cervical spine dated 5/31/2016.  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  There is no abnormal cord signal nor neural foraminal stenosis.  --EMG of bilateral lower extremities was done on 6/9/2016 with normal results.  --Labs reviewed including CRP, hepatitis B, sed rate,rheumatoid factor, HEMANT are negative.    -PHYSICAL THERAPY: Patient has not had physical therapy in the past I have ordered physical therapy for low back, thoracic and neck pain.  Like the patient to be given exercises that will help her with core strengthening and posture.  Like patient to do exercises on a daily basis.    Discussed the importance of core strengthening, ROM, stretching exercises with the patient and how each of these entities is important in decreasing pain.  Explained to the patient that the purpose of physical therapy is to teach the patient a home exercise program.   These exercises need to be performed every day in order to decrease pain and prevent future occurrences of pain.  Likened it to brushing one's teeth.      -MEDICATIONS: In the past patient has done well on duloxetine 20 mg.  I have rewritten a prescription for duloxetine 20 mg asked that she increase this every 7 days until she is at 60 mg.  -  Discussed multiple medication options today with patient. Discussed risks, side effects, and proper use of medications. Patient verbalized understanding.    -INTERVENTIONS: No interventions at this time.  Discussed risks and benefits of injections with patient today.    -PATIENT EDUCATION: 45 minutes of total visit time was spent face to face with the patient today, greater than 50% of total time spent with patient was spent on counseling, education, and coordinating care.  We had discussion about central sensitization.  We also discussed the term fibromyalgia.  -10 minutes spent outside of visit time, non-face-to-face time, reviewing chart.    -FOLLOW-UP:   Patient will follow-up in 6 weeks.    Advised patient to call [facility] if symptoms worsen or you have problems controlling bladder and bowel function.   ______________________________________________________________________    SUBJECTIVE:   Meggan Osei  is a 23 y.o. female who presents today for new patient evaluation of neck pain, thoracic, low back pain.  Patient has had widespread whole body pain for 3 years.  This started 2-3 weeks after 1 of her sinus surgeries.  She reports that the pain is constant, however variable in intensity.  She has numbness and tingling in bilateral upper and lower extremities.  Pain is worse with activity as well as when she is stressed and with lack of sleep.  Pain is better in the past with low-dose duloxetine.  She has had acupuncture 1 time as well as massage therapy with short-term relief.  She has not had physical therapy.  She is currently taking ibuprofen 600 mg as  needed with some short-term relief.  Heat is also helpful for her.  She denies any bowel or bladder changes, fevers, chills, unintentional weight loss.  She describes the pain as achy in her neck and low back.    -Treatment to Date: MRI of the cervical spine dated 5/31/2016.  Bilateral lower extremity EMG on 6/9/2016.  Several labs have been drawn.  She has not had physical therapy nor any surgeries.    -Medications:    Current Outpatient Medications on File Prior to Encounter   Medication Sig Dispense Refill     albuterol (PROAIR HFA;PROVENTIL HFA;VENTOLIN HFA) 90 mcg/actuation inhaler Inhale 2 puffs.       cetirizine (ZYRTEC) 10 MG tablet Reported on 3/24/2017       fluticasone (FLONASE) 50 mcg/actuation nasal spray 1-2 sprays.       ibuprofen 200 mg cap Take 600 mg by mouth every 6 (six) hours.       levonorgestrel-ethinyl estradiol (LARISSIA) 0.1-20 mg-mcg per tablet Take 1 tablet by mouth daily. 84 tablet 3     montelukast (SINGULAIR) 10 mg tablet Take 1 tablet (10 mg total) by mouth every evening. 90 tablet 0     omeprazole (PRILOSEC) 20 MG capsule Take 20 mg by mouth.       ondansetron (ZOFRAN-ODT) 4 MG disintegrating tablet Take 1 tablet (4 mg total) by mouth every 12 (twelve) hours as needed for nausea. 30 tablet 0     sodium chloride 0.65 % Drop 2 sprays.       cyclobenzaprine (FLEXERIL) 5 MG tablet Take 2 tablets (10 mg total) by mouth every 8 (eight) hours as needed for muscle spasms. 30 tablet 1     sulindac (CLINORIL) 200 MG tablet Take 200 mg by mouth 2 (two) times a day.       [DISCONTINUED] DULoxetine (CYMBALTA) 20 MG capsule Take 1 capsule (20 mg total) by mouth daily. 90 capsule 1     No current facility-administered medications on file prior to encounter.        Allergies   Allergen Reactions     Beta-Blockers (Beta-Adrenergic Blocking Agts) Other (See Comments)     Patient is on allergy injections, Beta Blockers contraindicated. If medically necessary, it is OK to prescribe a Beta Blocker, but  the allergy injections will need to be discontinued. No longer on allergy injections. JOAQUIN Moncada, [facility] 2/12/2016 8:11 AM  Patient is on allergy injections, Beta Blockers contraindicated. If medically necessary, it is OK to prescribe a Beta Blocker, but the allergy injections will need to be discontinued. No longer on allergy injections. JOAQUIN Moncada [facility] 2/12/2016 8:11 AM  Patient is on allergy injections, Beta Blockers contraindicated. If medically necessary, it is OK to prescribe a Beta Blocker, but the allergy injections will need to be discontinued. No longer on allergy injections. JOAQUIN Moncada, [facility] 2/12/2016 8:11 AM  Patient is on allergy injections, Beta Blockers contraindicated. If medically necessary, it is OK to prescribe a Beta Blocker, but the allergy injections will need to be discontinued. No longer on allergy injections. JOAQUIN Moncada, [facility] 2/12/2016 8:11 AM       No past medical history on file.     Patient Active Problem List   Diagnosis     Allergic rhinitis     Allergic rhinitis due to pollen     Body aches     Cold sensitivity     Mild intermittent asthma     Mucocele of nasal sinus     Need for desensitization to allergens     Polyp of anterior nasal cavity     Screening for cervical cancer     Sensory disturbance     Vitamin D deficiency       No past surgical history on file.    No family history on file.    Reviewed past medical, surgical, and family history with patient found on new patient intake packet located in EMR [employer] tab.     SOCIAL HX: Patient denies smoking, she drinks alcohol occasionally.  She denies any recreational drugs.  She works at [employer] as an  and has a second job as well.    ROS:  Specifically negative for bowel/bladder dysfunction, balance changes, headache, dizziness, foot drop, fevers, chills, appetite changes, nausea/vomiting, unexplained weight loss. Otherwise 13 systems reviewed are negative. Please see the patient's intake questionnaire from today for  "details.    OBJECTIVE:  /75 (Patient Site: Right Arm, Patient Position: Sitting, Cuff Size: Adult Regular)   Pulse 89   Temp 98.8  F (37.1  C) (Oral)   Resp 18   Ht 5' 6.5\" (1.689 m)   Wt 186 lb 3.2 oz (84.5 kg)   SpO2 99%   BMI 29.60 kg/m      PHYSICAL EXAMINATION:  --CONSTITUTIONAL: Vital signs as above. No acute distress. The patient is well nourished and well groomed.  --PSYCHIATRIC: The patient is awake, alert, oriented to person, place, time and answering questions appropriately with clear speech. Appropriate mood and affect   --HEENT: Sclera are non-injected.   --SKIN: Skin over the face, bilateral upper extremities, and posterior torso is clean, dry, intact without rashes.  --LYMPH NODES: No palpable or tender anterior/posterior cervical, submandibular, or supraclavicular lymph nodes.    --RESPIRATORY: Normal rhythm and effort. No abnormal accessory muscle breathing patterns noted.   --GROSS MOTOR: Easily arises from a seated position. Toe walking and heel walking are normal.    --CERVICAL SPINE: Inspection reveals no evidence of deformity. Range of motion is not limited in cervical flexion, extension, lateral rotation.  She has tenderness palpation along the low cervical paraspinal muscles of the trapezius and intrascapular muscles.  Spurling maneuver negative bilaterally.  --Lumbar spine: Inspection reveals no evidence of deformity.  Range of motion is not limited in lumbar flexion, extension, lateral rotation.  She has tenderness palpation along the low lumbar paraspinal muscles bilaterally.  Facet loading is mildly positive.  Straight leg raise is negative bilaterally for radicular symptoms.  --Sacroiliac joint: Kamala's, SI distraction and one finger point test are negative bilaterally.  --Hips: Normal range of motion of bilateral hips.  Stinchfield test is negative bilaterally.  --Lower extremity motor testing: Normal strength in bilateral lower limbs.  --SHOULDERS: Full range of motion " bilaterally. Negative empty can.  --UPPER EXTREMITY MOTOR TESTING:  Wrist flexion left 5/5, right 5/5  Wrist extension left 5/5, right 5/5  Pronators left 5/5, right 5/5  Biceps left 5/5, right 5/5   Triceps left 5/5, right 5/5   Shoulder abduction left 5/5, right 5/5   left 5/5, right 5/5  --NEUROLOGIC: 2/4 symmetric biceps, brachioradialis, triceps, patella, Achilles reflexes bilaterally. Sensation to upper and lower extremities  is normal.  Negative Manzo's bilaterally.    --VASCULAR: 2/4 radial and dorsalis pedis pulses bilaterally. Warm upper and lower limbs bilaterally.     RESULTS: Prior medical records from [facility] and [facility] labs and imaging were reviewed today.     Imaging:  Cervical spine Imaging report was personally reviewed.

## 2021-06-25 NOTE — TELEPHONE ENCOUNTER
----- Message from Jose Olmedo, DO sent at 3/19/2019  8:12 AM CDT -----  Could you please call the patient and let her know that I reviewed the labs?  Her sed rate, CCP antibodies, CRP, vitamin B12 are normal values.  Her vitamin D level is low.    Recommendations: Recommend that she take 50,000 units of vitamin D once a week for 12 weeks.  Thereafter taking 2000 units of vitamin D daily.  I be happy to order this for her if she would like.  Also recommended she continue with physical therapy and follow-up as scheduled.

## 2021-07-03 NOTE — ADDENDUM NOTE
Addendum Note by Merna Balderrama MD at 1/6/2021 11:20 AM     Author: Merna Balderrama MD Service: -- Author Type: Physician    Filed: 1/6/2021  1:35 PM Encounter Date: 1/6/2021 Status: Signed    : Merna Balderrama MD (Physician)    Addended by: MERNA BALDERRAMA on: 1/6/2021 01:35 PM        Modules accepted: Orders

## 2021-09-19 ENCOUNTER — HEALTH MAINTENANCE LETTER (OUTPATIENT)
Age: 26
End: 2021-09-19

## 2022-01-31 ENCOUNTER — OFFICE VISIT (OUTPATIENT)
Dept: FAMILY MEDICINE | Facility: CLINIC | Age: 27
End: 2022-01-31
Payer: COMMERCIAL

## 2022-01-31 VITALS
HEIGHT: 66 IN | HEART RATE: 90 BPM | DIASTOLIC BLOOD PRESSURE: 78 MMHG | TEMPERATURE: 97.8 F | OXYGEN SATURATION: 98 % | RESPIRATION RATE: 18 BRPM | SYSTOLIC BLOOD PRESSURE: 116 MMHG | WEIGHT: 167 LBS | BODY MASS INDEX: 26.84 KG/M2

## 2022-01-31 DIAGNOSIS — Z30.8 ENCOUNTER FOR OTHER CONTRACEPTIVE MANAGEMENT: Primary | ICD-10-CM

## 2022-01-31 PROCEDURE — 99213 OFFICE O/P EST LOW 20 MIN: CPT | Performed by: FAMILY MEDICINE

## 2022-01-31 RX ORDER — LEVONORGESTREL/ETHIN.ESTRADIOL 0.1-0.02MG
1 TABLET ORAL DAILY
Qty: 28 TABLET | Refills: 11 | Status: SHIPPED | OUTPATIENT
Start: 2022-01-31 | End: 2023-01-17

## 2022-01-31 ASSESSMENT — MIFFLIN-ST. JEOR: SCORE: 1509.26

## 2022-01-31 NOTE — PATIENT INSTRUCTIONS
Patient Education     How Birth Control Works  Birth control prevents pregnancy by preventing conception. Some methods prevent an egg from maturing. Some keep the sperm and egg from meeting. And some methods work in both ways.  Preventing ovulation  Certain hormones help prevent an egg from maturing and being released. Hormone methods include:    Birth control pills    Skin patches    Contraceptive vaginal rings    Injections  Preventing sperm and egg from meeting  Methods that prevent the sperm and egg from joining include:    Barrier methods, such as the condom, the diaphragm, and the cervical cap    Spermicide    The IUD (intrauterine device)    Sterilization    Natural family planning    Some types of hormone methods  Alvina last reviewed this educational content on 4/1/2020 2000-2021 [employer]. All rights reserved. This information is not intended as a substitute for professional medical care. Always follow your healthcare professional's instructions.

## 2022-01-31 NOTE — PROGRESS NOTES
"  Assessment & Plan     (Z30.8) Encounter for other contraceptive management  (primary encounter diagnosis)  Comment: 26 year old female has on ocp since she was 17 years old and no side effect and she likes continue use it.   Plan: levonorgestrel-ethinyl estradiol (LUTERA)         0.1-20 MG-MCG tablet      Continue the ocp and side effect and std prevention addressed.        [id number]}     BMI:   Estimated body mass index is 26.95 kg/m  as calculated from the following:    Height as of this encounter: 1.676 m (5' 6\").    Weight as of this encounter: 75.8 kg (167 lb).             Return in about 1 month (around 2/28/2022) for Routine preventive.    Tsering Harrison MD  [facility]    Tere Broderick is a 26 year old who presents for the following health issues     HPI     Pt is taking ocp for bc for years and no side effect. She likes continue use it.       Review of Systems   Constitutional, HEENT, cardiovascular, pulmonary, gi and gu systems are negative, except as otherwise noted.      Objective    /78 (BP Location: Left arm, Patient Position: Sitting, Cuff Size: Adult Regular)   Pulse 90   Temp 97.8  F (36.6  C) (Oral)   Resp 18   Ht 1.676 m (5' 6\")   Wt 75.8 kg (167 lb)   LMP 12/27/2021 (Approximate)   SpO2 98%   Breastfeeding No   BMI 26.95 kg/m    Body mass index is 26.95 kg/m .  Physical Exam   GENERAL: healthy, alert and no distress  PSYCH: mentation appears normal, affect normal/bright     "

## 2022-02-28 ENCOUNTER — OFFICE VISIT (OUTPATIENT)
Dept: FAMILY MEDICINE | Facility: CLINIC | Age: 27
End: 2022-02-28
Payer: COMMERCIAL

## 2022-02-28 VITALS
DIASTOLIC BLOOD PRESSURE: 80 MMHG | OXYGEN SATURATION: 98 % | HEIGHT: 67 IN | TEMPERATURE: 98.2 F | RESPIRATION RATE: 16 BRPM | WEIGHT: 161 LBS | HEART RATE: 82 BPM | BODY MASS INDEX: 25.27 KG/M2 | SYSTOLIC BLOOD PRESSURE: 124 MMHG

## 2022-02-28 DIAGNOSIS — J45.20 MILD INTERMITTENT ASTHMA WITHOUT COMPLICATION: ICD-10-CM

## 2022-02-28 DIAGNOSIS — Z00.00 ROUTINE GENERAL MEDICAL EXAMINATION AT A HEALTH CARE FACILITY: Primary | ICD-10-CM

## 2022-02-28 LAB — HIV 1+2 AB+HIV1 P24 AG SERPL QL IA: NEGATIVE

## 2022-02-28 PROCEDURE — 99395 PREV VISIT EST AGE 18-39: CPT | Mod: 25 | Performed by: FAMILY MEDICINE

## 2022-02-28 PROCEDURE — 90732 PPSV23 VACC 2 YRS+ SUBQ/IM: CPT | Performed by: FAMILY MEDICINE

## 2022-02-28 PROCEDURE — 90471 IMMUNIZATION ADMIN: CPT | Performed by: FAMILY MEDICINE

## 2022-02-28 PROCEDURE — 87389 HIV-1 AG W/HIV-1&-2 AB AG IA: CPT | Performed by: FAMILY MEDICINE

## 2022-02-28 PROCEDURE — 36415 COLL VENOUS BLD VENIPUNCTURE: CPT | Performed by: FAMILY MEDICINE

## 2022-02-28 PROCEDURE — 86803 HEPATITIS C AB TEST: CPT | Performed by: FAMILY MEDICINE

## 2022-02-28 RX ORDER — ALBUTEROL SULFATE 90 UG/1
AEROSOL, METERED RESPIRATORY (INHALATION)
Qty: 18 G | Refills: 11 | Status: SHIPPED | OUTPATIENT
Start: 2022-02-28

## 2022-02-28 ASSESSMENT — ASTHMA QUESTIONNAIRES: ACT_TOTALSCORE: 17

## 2022-02-28 NOTE — PATIENT INSTRUCTIONS
Preventive Health Recommendations  Female Ages 26 - 39  Yearly exam:   See your health care provider every year in order to    Review health changes.     Discuss preventive care.      Review your medicines if you your doctor has prescribed any.    Until age 30: Get a Pap test every three years (more often if you have had an abnormal result).    After age 30: Talk to your doctor about whether you should have a Pap test every 3 years or have a Pap test with HPV screening every 5 years.   You do not need a Pap test if your uterus was removed (hysterectomy) and you have not had cancer.  You should be tested each year for STDs (sexually transmitted diseases), if you're at risk.   Talk to your provider about how often to have your cholesterol checked.  If you are at risk for diabetes, you should have a diabetes test (fasting glucose).  Shots: Get a flu shot each year. Get a tetanus shot every 10 years.   Nutrition:     Eat at least 5 servings of fruits and vegetables each day.    Eat whole-grain bread, whole-wheat pasta and brown rice instead of white grains and rice.    Get adequate Calcium and Vitamin D.     Lifestyle    Exercise at least 150 minutes a week (30 minutes a day, 5 days of the week). This will help you control your weight and prevent disease.    Limit alcohol to one drink per day.    No smoking.     Wear sunscreen to prevent skin cancer.    See your dentist every six months for an exam and cleaning.    
Normal vision: sees adequately in most situations; can see medication labels, newsprint

## 2022-02-28 NOTE — PROGRESS NOTES
SUBJECTIVE:   CC: Meggan Osei is an 26 year old woman who presents for preventive health visit.   She is doing well. She uses albuterol inh prn for asthma, well controlled. No wheezing and sob, cough.   Declined covid vaccine.     Patient has been advised of split billing requirements and indicates understanding: Yes  Healthy Habits:     Getting at least 3 servings of Calcium per day:  NO    Bi-annual eye exam:  NO    Dental care twice a year:  NO    Sleep apnea or symptoms of sleep apnea:  Daytime drowsiness    Diet:  Regular (no restrictions)    Frequency of exercise:  None    Taking medications regularly:  Yes    Medication side effects:  None    PHQ-2 Total Score: 1    Additional concerns today:  No              Today's PHQ-2 Score:   PHQ-2 ( 1999 [employer]) 2/26/2022   Q1: Little interest or pleasure in doing things 0   Q2: Feeling down, depressed or hopeless 1   PHQ-2 Score 1   Q1: Little interest or pleasure in doing things Not at all   Q2: Feeling down, depressed or hopeless Several days   PHQ-2 Score 1       Abuse: Current or Past (Physical, Sexual or Emotional) - No  Do you feel safe in your environment? Yes    Have you ever done Advance Care Planning? (For example, a Health Directive, POLST, or a discussion with a medical provider or your loved ones about your wishes): No, advance care planning information given to patient to review.  Patient plans to discuss their wishes with loved ones or provider.      Social History     Tobacco Use     Smoking status: Never Smoker     Smokeless tobacco: Never Used     Tobacco comment: VAPING   Substance Use Topics     Alcohol use: Yes     If you drink alcohol do you typically have >3 drinks per day or >7 drinks per week? No    Alcohol Use 2/26/2022   Prescreen: >3 drinks/day or >7 drinks/week? No       Reviewed orders with patient.  Reviewed health maintenance and updated orders accordingly - Yes.   Lab work is in process  Labs reviewed in EPIC    Breast Cancer  Screening:    FHS-7:   Breast CA Risk Assessment (FHS-7) 2/26/2022   Did any of your first-degree relatives have breast or ovarian cancer? Unknown   Did any of your relatives have bilateral breast cancer? Unknown   Did any man in your family have breast cancer? No   Did any woman in your family have breast and ovarian cancer? Yes   Did any woman in your family have breast cancer before age 50 y? Unknown   Do you have 2 or more relatives with breast and/or ovarian cancer? No   Do you have 2 or more relatives with breast and/or bowel cancer? No       Patient under 40 years of age: Routine Mammogram Screening not recommended.   Pertinent mammograms are reviewed under the imaging tab.    History of abnormal Pap smear: NO - age 21-29 PAP every 3 years recommended  PAP / HPV Latest Ref Rng & Units 1/6/2021   PAP Negative for squamous intraepithelial lesion or malignancy. Negative for squamous intraepithelial lesion or malignancy  Electronically signed by Sabra Ricci CT (ASCP) on 1/8/2021 at  1:55 PM       Reviewed and updated as needed this visit by clinical staff   Tobacco  Allergies  Meds      Soc Hx        Reviewed and updated as needed this visit by Provider                 History reviewed. No pertinent past medical history.   History reviewed. No pertinent surgical history.    Review of Systems  CONSTITUTIONAL: NEGATIVE for fever, chills, change in weight  INTEGUMENTARU/SKIN: NEGATIVE for worrisome rashes, moles or lesions  EYES: NEGATIVE for vision changes or irritation  ENT: NEGATIVE for ear, mouth and throat problems  RESP: NEGATIVE for significant cough or SOB  BREAST: NEGATIVE for masses, tenderness or discharge  CV: NEGATIVE for chest pain, palpitations or peripheral edema  GI: NEGATIVE for nausea, abdominal pain, heartburn, or change in bowel habits  : NEGATIVE for unusual urinary or vaginal symptoms. Periods are regular.  MUSCULOSKELETAL: NEGATIVE for significant arthralgias or  "myalgia  NEURO: NEGATIVE for weakness, dizziness or paresthesias  PSYCHIATRIC: NEGATIVE for changes in mood or affect     OBJECTIVE:   /80 (BP Location: Right arm, Patient Position: Sitting, Cuff Size: Adult Regular)   Pulse 82   Temp 98.2  F (36.8  C) (Oral)   Resp 16   Ht 1.69 m (5' 6.54\")   Wt 73 kg (161 lb)   LMP  (LMP Unknown)   SpO2 98%   Breastfeeding No   BMI 25.57 kg/m    Physical Exam  GENERAL: healthy, alert and no distress  EYES: Eyes grossly normal to inspection, PERRL and conjunctivae and sclerae normal  HENT: ear canals and TM's normal, nose and mouth without ulcers or lesions  NECK: no adenopathy, no asymmetry, masses, or scars and thyroid normal to palpation  RESP: lungs clear to auscultation - no rales, rhonchi or wheezes  BREAST: normal without masses, tenderness or nipple discharge and no palpable axillary masses or adenopathy  CV: regular rate and rhythm, normal S1 S2, no S3 or S4, no murmur, click or rub, no peripheral edema and peripheral pulses strong  ABDOMEN: soft, nontender, no hepatosplenomegaly, no masses and bowel sounds normal  MS: no gross musculoskeletal defects noted, no edema  SKIN: no suspicious lesions or rashes  NEURO: Normal strength and tone, mentation intact and speech normal  PSYCH: mentation appears normal, affect normal/bright    Diagnostic Test Results:  Labs reviewed in Epic    ASSESSMENT/PLAN:       ICD-10-CM    1. Routine general medical examination at a health care facility  Z00.00 HIV Antigen Antibody Combo     Hepatitis C antibody   2. Mild intermittent asthma without complication  J45.20 albuterol (VENTOLIN HFA) 108 (90 Base) MCG/ACT inhaler       Patient has been advised of split billing requirements and indicates understanding: Yes    COUNSELING:  Reviewed preventive health counseling, as reflected in patient instructions       Regular exercise       Healthy diet/nutrition       Vision screening       Contraception       Family planning       " "Osteoporosis prevention/bone health       Safe sex practices/STD prevention       Consider Hep C screening for all patients one time for ages 18-79 years       HIV screeninx in teen years, 1x in adult years, and at intervals if high risk       Advance Care Planning    Estimated body mass index is 25.57 kg/m  as calculated from the following:    Height as of this encounter: 1.69 m (5' 6.54\").    Weight as of this encounter: 73 kg (161 lb).    Weight management plan: Discussed healthy diet and exercise guidelines    She reports that she has never smoked. She has never used smokeless tobacco.      Counseling Resources:  ATP IV Guidelines  Pooled Cohorts Equation Calculator  Breast Cancer Risk Calculator  BRCA-Related Cancer Risk Assessment: FHS-7 Tool  FRAX Risk Assessment  ICSI Preventive Guidelines  Dietary Guidelines for Americans, 2010  USDA's MyPlate  ASA Prophylaxis  Lung CA Screening    Tsering Harrison MD  [facility]  "

## 2022-03-01 LAB — HCV AB SERPL QL IA: NEGATIVE

## 2022-09-07 ENCOUNTER — OFFICE VISIT (OUTPATIENT)
Dept: FAMILY MEDICINE | Facility: CLINIC | Age: 27
End: 2022-09-07
Payer: COMMERCIAL

## 2022-09-07 VITALS
DIASTOLIC BLOOD PRESSURE: 80 MMHG | RESPIRATION RATE: 16 BRPM | BODY MASS INDEX: 28.16 KG/M2 | SYSTOLIC BLOOD PRESSURE: 122 MMHG | WEIGHT: 179.4 LBS | TEMPERATURE: 98.3 F | HEIGHT: 67 IN | HEART RATE: 62 BPM

## 2022-09-07 DIAGNOSIS — R55 SYNCOPE, UNSPECIFIED SYNCOPE TYPE: Primary | ICD-10-CM

## 2022-09-07 DIAGNOSIS — N91.2 AMENORRHEA: ICD-10-CM

## 2022-09-07 LAB
ALBUMIN SERPL BCG-MCNC: 4.1 G/DL (ref 3.5–5.2)
ALP SERPL-CCNC: 58 U/L (ref 35–104)
ALT SERPL W P-5'-P-CCNC: 14 U/L (ref 10–35)
ANION GAP SERPL CALCULATED.3IONS-SCNC: 7 MMOL/L (ref 7–15)
AST SERPL W P-5'-P-CCNC: 28 U/L (ref 10–35)
BILIRUB SERPL-MCNC: 0.2 MG/DL
BUN SERPL-MCNC: 9.9 MG/DL (ref 6–20)
CALCIUM SERPL-MCNC: 9.1 MG/DL (ref 8.6–10)
CHLORIDE SERPL-SCNC: 102 MMOL/L (ref 98–107)
CREAT SERPL-MCNC: 0.9 MG/DL (ref 0.51–0.95)
DEPRECATED HCO3 PLAS-SCNC: 27 MMOL/L (ref 22–29)
ERYTHROCYTE [DISTWIDTH] IN BLOOD BY AUTOMATED COUNT: 11.4 % (ref 10–15)
GFR SERPL CREATININE-BSD FRML MDRD: 89 ML/MIN/1.73M2
GLUCOSE SERPL-MCNC: 88 MG/DL (ref 70–99)
HCG UR QL: NEGATIVE
HCT VFR BLD AUTO: 38 % (ref 35–47)
HGB BLD-MCNC: 12.6 G/DL (ref 11.7–15.7)
MCH RBC QN AUTO: 30.7 PG (ref 26.5–33)
MCHC RBC AUTO-ENTMCNC: 33.2 G/DL (ref 31.5–36.5)
MCV RBC AUTO: 93 FL (ref 78–100)
PLATELET # BLD AUTO: 244 10E3/UL (ref 150–450)
POTASSIUM SERPL-SCNC: 4.2 MMOL/L (ref 3.4–5.3)
PROT SERPL-MCNC: 7.4 G/DL (ref 6.4–8.3)
RBC # BLD AUTO: 4.1 10E6/UL (ref 3.8–5.2)
SODIUM SERPL-SCNC: 136 MMOL/L (ref 136–145)
WBC # BLD AUTO: 5.2 10E3/UL (ref 4–11)

## 2022-09-07 PROCEDURE — 36415 COLL VENOUS BLD VENIPUNCTURE: CPT | Performed by: FAMILY MEDICINE

## 2022-09-07 PROCEDURE — 99214 OFFICE O/P EST MOD 30 MIN: CPT | Performed by: FAMILY MEDICINE

## 2022-09-07 PROCEDURE — 85027 COMPLETE CBC AUTOMATED: CPT | Performed by: FAMILY MEDICINE

## 2022-09-07 PROCEDURE — 81025 URINE PREGNANCY TEST: CPT | Performed by: FAMILY MEDICINE

## 2022-09-07 PROCEDURE — 80053 COMPREHEN METABOLIC PANEL: CPT | Performed by: FAMILY MEDICINE

## 2022-09-07 ASSESSMENT — ASTHMA QUESTIONNAIRES
QUESTION_3 LAST FOUR WEEKS HOW OFTEN DID YOUR ASTHMA SYMPTOMS (WHEEZING, COUGHING, SHORTNESS OF BREATH, CHEST TIGHTNESS OR PAIN) WAKE YOU UP AT NIGHT OR EARLIER THAN USUAL IN THE MORNING: ONCE OR TWICE
QUESTION_5 LAST FOUR WEEKS HOW WOULD YOU RATE YOUR ASTHMA CONTROL: WELL CONTROLLED
QUESTION_2 LAST FOUR WEEKS HOW OFTEN HAVE YOU HAD SHORTNESS OF BREATH: THREE TO SIX TIMES A WEEK
ACT_TOTALSCORE: 18
QUESTION_1 LAST FOUR WEEKS HOW MUCH OF THE TIME DID YOUR ASTHMA KEEP YOU FROM GETTING AS MUCH DONE AT WORK, SCHOOL OR AT HOME: A LITTLE OF THE TIME
QUESTION_4 LAST FOUR WEEKS HOW OFTEN HAVE YOU USED YOUR RESCUE INHALER OR NEBULIZER MEDICATION (SUCH AS ALBUTEROL): TWO OR THREE TIMES PER WEEK
ACT_TOTALSCORE: 18

## 2022-09-07 ASSESSMENT — PAIN SCALES - GENERAL: PAINLEVEL: NO PAIN (0)

## 2022-09-07 ASSESSMENT — ENCOUNTER SYMPTOMS: SYNCOPE: 1

## 2022-09-07 NOTE — PROGRESS NOTES
"  Assessment & Plan     (R55) Syncope, unspecified syncope type  (primary encounter diagnosis)  Comment: pt had one episode of fainting. She was at home with several other people standing and talking. She did not feel well, a little nausea and She fainted, lasted for < 1 minute. Her head hit the door. She recall what happened before and after her fainting. No witnessed of body twisting, urine incontinence, no tongue biting. No headache after. No history of head injury and seizure. Denies smoke, alcohol and drugs. Exam is normal. Normal cbc. Likely she has syncope.      Plan: CBC with platelets, Comprehensive metabolic         panel (BMP + Alb, Alk Phos, ALT, AST, Total.         Bili, TP)        We discussed the safety and advise her to sit down or lay down if she dose not feel good. If happens again advise to follow-up.     (N91.2) Amenorrhea  Comment: pt stopped ocp one month ago, she had no side effect. She has unprotected sex and she is worried about if she is pregnant.   Plan: HCG Qual, Urine ([id number])        Pregnant test is negative. She wants to resume ocp.      0956}     BMI:   Estimated body mass index is 28.1 kg/m  as calculated from the following:    Height as of this encounter: 1.702 m (5' 7\").    Weight as of this encounter: 81.4 kg (179 lb 6.4 oz).   Weight management plan: Discussed healthy diet and exercise guidelines          Return if symptoms worsen or fail to improve.    Tsering Harrison MD  [facility]    Tere Broderick is a 27 year old, presenting for the following health issues:  Syncope (Fainted 9/2/22 has had no sx since- stopped taking B/C 8/13/2022 and has been having unprotected sex, does have irregular periods. She is wondering if she could be pregnant- has had negative at home tests)      Syncope     History of Present Illness       Reason for visit:  Fainting  Symptom onset:  1-3 days ago  Symptoms include:  Fainted  Symptom intensity:  Mild  Symptom " "progression:  Staying the same  Had these symptoms before:  No  What makes it worse:  NA  What makes it better:  NA    She eats 0-1 servings of fruits and vegetables daily.She consumes 3 sweetened beverage(s) daily.She exercises with enough effort to increase her heart rate 9 or less minutes per day.  She exercises with enough effort to increase her heart rate 3 or less days per week.   She is taking medications regularly.      Review of Systems   Cardiovascular: Positive for syncope.      Constitutional, HEENT, cardiovascular, pulmonary, gi and gu systems are negative, except as otherwise noted.      Objective    /80 (BP Location: Right arm, Patient Position: Sitting, Cuff Size: Adult Regular)   Pulse 62   Temp 98.3  F (36.8  C) (Oral)   Resp 16   Ht 1.702 m (5' 7\")   Wt 81.4 kg (179 lb 6.4 oz)   LMP 07/22/2022   Breastfeeding No   BMI 28.10 kg/m    Body mass index is 28.1 kg/m .  Physical Exam   GENERAL: healthy, alert and no distress  NECK: no adenopathy, no asymmetry, masses, or scars and thyroid normal to palpation  RESP: lungs clear to auscultation - no rales, rhonchi or wheezes  CV: regular rate and rhythm, normal S1 S2, no S3 or S4, no murmur, click or rub, no peripheral edema and peripheral pulses strong  ABDOMEN: soft, nontender, no hepatosplenomegaly, no masses and bowel sounds normal  MS: no gross musculoskeletal defects noted, no edema     urine pregnant test negative   CBC RESULTS: Recent Labs   Lab Test 09/07/22  0947   WBC 5.2   RBC 4.10   HGB 12.6   HCT 38.0   MCV 93   MCH 30.7   MCHC 33.2   RDW 11.4          "

## 2022-11-21 ENCOUNTER — HEALTH MAINTENANCE LETTER (OUTPATIENT)
Age: 27
End: 2022-11-21

## 2023-01-16 DIAGNOSIS — Z30.8 ENCOUNTER FOR OTHER CONTRACEPTIVE MANAGEMENT: ICD-10-CM

## 2023-01-17 RX ORDER — LEVONORGESTREL AND ETHINYL ESTRADIOL 0.1-0.02MG
KIT ORAL
Qty: 84 TABLET | Refills: 2 | Status: SHIPPED | OUTPATIENT
Start: 2023-01-17 | End: 2023-09-25

## 2023-01-17 NOTE — TELEPHONE ENCOUNTER
"Last Written Prescription Date:  1/31/22  Last Fill Quantity: 28,  # refills: 11   Last office visit provider:  9/7/22     Requested Prescriptions   Pending Prescriptions Disp Refills     SRONYX 0.1-20 MG-MCG tablet [Pharmacy Med Name: SRONYX 0.10-0.02 MG TABLET] 84 tablet 3     Sig: TAKE 1 TABLET BY MOUTH EVERY DAY       Contraceptives Protocol Passed - 1/16/2023  3:14 PM        Passed - Patient is not a current smoker if age is 35 or older        Passed - Recent (12 mo) or future (30 days) visit within the authorizing provider's specialty     Patient has had an office visit with the authorizing provider or a provider within the authorizing providers department within the previous 12 mos or has a future within next 30 days. See \"Patient Info\" tab in inbasket, or \"Choose Columns\" in Meds & Orders section of the refill encounter.              Passed - Medication is active on med list        Passed - No active pregnancy on record        Passed - No positive pregnancy test in past 12 months             Anastasia Hummel RN 01/17/23 11:49 AM  "

## 2023-04-16 ENCOUNTER — HEALTH MAINTENANCE LETTER (OUTPATIENT)
Age: 28
End: 2023-04-16

## 2023-09-23 DIAGNOSIS — Z30.8 ENCOUNTER FOR OTHER CONTRACEPTIVE MANAGEMENT: ICD-10-CM

## 2023-09-25 RX ORDER — LEVONORGESTREL AND ETHINYL ESTRADIOL 0.1-0.02MG
KIT ORAL
Qty: 84 TABLET | Refills: 0 | Status: SHIPPED | OUTPATIENT
Start: 2023-09-25

## 2025-03-29 ENCOUNTER — HEALTH MAINTENANCE LETTER (OUTPATIENT)
Age: 30
End: 2025-03-29